# Patient Record
Sex: MALE | Race: WHITE | Employment: OTHER
[De-identification: names, ages, dates, MRNs, and addresses within clinical notes are randomized per-mention and may not be internally consistent; named-entity substitution may affect disease eponyms.]

---

## 2023-07-27 ENCOUNTER — APPOINTMENT (OUTPATIENT)
Facility: HOSPITAL | Age: 73
End: 2023-07-27
Payer: OTHER GOVERNMENT

## 2023-07-27 ENCOUNTER — HOSPITAL ENCOUNTER (EMERGENCY)
Facility: HOSPITAL | Age: 73
Discharge: ANOTHER HOSPITAL | End: 2023-07-28
Attending: EMERGENCY MEDICINE
Payer: OTHER GOVERNMENT

## 2023-07-27 DIAGNOSIS — H40.212 ACUTE ANGLE-CLOSURE GLAUCOMA OF LEFT EYE: Primary | ICD-10-CM

## 2023-07-27 DIAGNOSIS — I10 ESSENTIAL HYPERTENSION: ICD-10-CM

## 2023-07-27 LAB
ALBUMIN SERPL-MCNC: 4.5 G/DL (ref 3.5–5)
ALBUMIN/GLOB SERPL: 1 (ref 1.1–2.2)
ALP SERPL-CCNC: 86 U/L (ref 45–117)
ALT SERPL-CCNC: 28 U/L (ref 12–78)
ANION GAP SERPL CALC-SCNC: 13 MMOL/L (ref 5–15)
AST SERPL-CCNC: 21 U/L (ref 15–37)
BASOPHILS # BLD: 0 K/UL (ref 0–0.1)
BASOPHILS NFR BLD: 0 % (ref 0–1)
BILIRUB SERPL-MCNC: 1 MG/DL (ref 0.2–1)
BUN SERPL-MCNC: 24 MG/DL (ref 6–20)
BUN/CREAT SERPL: 19 (ref 12–20)
CALCIUM SERPL-MCNC: 10 MG/DL (ref 8.5–10.1)
CHLORIDE SERPL-SCNC: 102 MMOL/L (ref 97–108)
CO2 SERPL-SCNC: 20 MMOL/L (ref 21–32)
CREAT SERPL-MCNC: 1.24 MG/DL (ref 0.7–1.3)
DIFFERENTIAL METHOD BLD: ABNORMAL
EOSINOPHIL # BLD: 0 K/UL (ref 0–0.4)
EOSINOPHIL NFR BLD: 0 % (ref 0–7)
ERYTHROCYTE [DISTWIDTH] IN BLOOD BY AUTOMATED COUNT: 12.1 % (ref 11.5–14.5)
GLOBULIN SER CALC-MCNC: 4.4 G/DL (ref 2–4)
GLUCOSE SERPL-MCNC: 284 MG/DL (ref 65–100)
HCT VFR BLD AUTO: 46.1 % (ref 36.6–50.3)
HGB BLD-MCNC: 16 G/DL (ref 12.1–17)
IMM GRANULOCYTES # BLD AUTO: 0.1 K/UL (ref 0–0.04)
IMM GRANULOCYTES NFR BLD AUTO: 1 % (ref 0–0.5)
LIPASE SERPL-CCNC: 51 U/L (ref 73–393)
LYMPHOCYTES # BLD: 1 K/UL (ref 0.8–3.5)
LYMPHOCYTES NFR BLD: 7 % (ref 12–49)
MAGNESIUM SERPL-MCNC: 2 MG/DL (ref 1.6–2.4)
MCH RBC QN AUTO: 29.7 PG (ref 26–34)
MCHC RBC AUTO-ENTMCNC: 34.7 G/DL (ref 30–36.5)
MCV RBC AUTO: 85.7 FL (ref 80–99)
MONOCYTES # BLD: 0.4 K/UL (ref 0–1)
MONOCYTES NFR BLD: 3 % (ref 5–13)
NEUTS SEG # BLD: 12.4 K/UL (ref 1.8–8)
NEUTS SEG NFR BLD: 89 % (ref 32–75)
NRBC # BLD: 0 K/UL (ref 0–0.01)
NRBC BLD-RTO: 0 PER 100 WBC
PLATELET # BLD AUTO: 238 K/UL (ref 150–400)
PMV BLD AUTO: 9.6 FL (ref 8.9–12.9)
POTASSIUM SERPL-SCNC: 5.1 MMOL/L (ref 3.5–5.1)
PROT SERPL-MCNC: 8.9 G/DL (ref 6.4–8.2)
RBC # BLD AUTO: 5.38 M/UL (ref 4.1–5.7)
SODIUM SERPL-SCNC: 135 MMOL/L (ref 136–145)
TROPONIN I SERPL HS-MCNC: 14 NG/L (ref 0–76)
WBC # BLD AUTO: 13.8 K/UL (ref 4.1–11.1)

## 2023-07-27 PROCEDURE — 84484 ASSAY OF TROPONIN QUANT: CPT

## 2023-07-27 PROCEDURE — 80053 COMPREHEN METABOLIC PANEL: CPT

## 2023-07-27 PROCEDURE — 96365 THER/PROPH/DIAG IV INF INIT: CPT

## 2023-07-27 PROCEDURE — 96375 TX/PRO/DX INJ NEW DRUG ADDON: CPT

## 2023-07-27 PROCEDURE — 2500000003 HC RX 250 WO HCPCS: Performed by: EMERGENCY MEDICINE

## 2023-07-27 PROCEDURE — 36415 COLL VENOUS BLD VENIPUNCTURE: CPT

## 2023-07-27 PROCEDURE — 99285 EMERGENCY DEPT VISIT HI MDM: CPT

## 2023-07-27 PROCEDURE — 83735 ASSAY OF MAGNESIUM: CPT

## 2023-07-27 PROCEDURE — 74177 CT ABD & PELVIS W/CONTRAST: CPT

## 2023-07-27 PROCEDURE — 93005 ELECTROCARDIOGRAM TRACING: CPT | Performed by: EMERGENCY MEDICINE

## 2023-07-27 PROCEDURE — 2580000003 HC RX 258: Performed by: STUDENT IN AN ORGANIZED HEALTH CARE EDUCATION/TRAINING PROGRAM

## 2023-07-27 PROCEDURE — 85025 COMPLETE CBC W/AUTO DIFF WBC: CPT

## 2023-07-27 PROCEDURE — 6360000004 HC RX CONTRAST MEDICATION: Performed by: EMERGENCY MEDICINE

## 2023-07-27 PROCEDURE — 6360000002 HC RX W HCPCS: Performed by: STUDENT IN AN ORGANIZED HEALTH CARE EDUCATION/TRAINING PROGRAM

## 2023-07-27 PROCEDURE — 83690 ASSAY OF LIPASE: CPT

## 2023-07-27 PROCEDURE — 6370000000 HC RX 637 (ALT 250 FOR IP): Performed by: EMERGENCY MEDICINE

## 2023-07-27 RX ORDER — MANNITOL 20 G/100ML
100 INJECTION, SOLUTION INTRAVENOUS ONCE
Status: DISCONTINUED | OUTPATIENT
Start: 2023-07-27 | End: 2023-07-28 | Stop reason: HOSPADM

## 2023-07-27 RX ORDER — PILOCARPINE HYDROCHLORIDE 10 MG/ML
1 SOLUTION/ DROPS OPHTHALMIC
Status: DISCONTINUED | OUTPATIENT
Start: 2023-07-27 | End: 2023-07-27

## 2023-07-27 RX ORDER — ACETAZOLAMIDE 250 MG/1
500 TABLET ORAL
Status: COMPLETED | OUTPATIENT
Start: 2023-07-27 | End: 2023-07-27

## 2023-07-27 RX ORDER — ONDANSETRON 2 MG/ML
4 INJECTION INTRAMUSCULAR; INTRAVENOUS EVERY 6 HOURS PRN
Status: DISCONTINUED | OUTPATIENT
Start: 2023-07-27 | End: 2023-07-28 | Stop reason: HOSPADM

## 2023-07-27 RX ORDER — TIMOLOL MALEATE 5 MG/ML
1 SOLUTION/ DROPS OPHTHALMIC
Status: COMPLETED | OUTPATIENT
Start: 2023-07-27 | End: 2023-07-27

## 2023-07-27 RX ORDER — 0.9 % SODIUM CHLORIDE 0.9 %
1000 INTRAVENOUS SOLUTION INTRAVENOUS ONCE
Status: COMPLETED | OUTPATIENT
Start: 2023-07-27 | End: 2023-07-27

## 2023-07-27 RX ORDER — MANNITOL 20 G/100ML
0.25 INJECTION, SOLUTION INTRAVENOUS ONCE
Status: DISCONTINUED | OUTPATIENT
Start: 2023-07-27 | End: 2023-07-27

## 2023-07-27 RX ORDER — MANNITOL 20 G/100ML
100 INJECTION, SOLUTION INTRAVENOUS ONCE
Status: COMPLETED | OUTPATIENT
Start: 2023-07-27 | End: 2023-07-27

## 2023-07-27 RX ADMIN — TIMOLOL MALEATE 1 DROP: 5 SOLUTION/ DROPS OPHTHALMIC at 22:36

## 2023-07-27 RX ADMIN — SODIUM CHLORIDE 1000 ML: 9 INJECTION, SOLUTION INTRAVENOUS at 20:17

## 2023-07-27 RX ADMIN — ONDANSETRON 4 MG: 2 INJECTION INTRAMUSCULAR; INTRAVENOUS at 20:16

## 2023-07-27 RX ADMIN — IOPAMIDOL 100 ML: 755 INJECTION, SOLUTION INTRAVENOUS at 22:29

## 2023-07-27 RX ADMIN — MANNITOL 100 G: 20 INJECTION, SOLUTION INTRAVENOUS at 22:40

## 2023-07-27 RX ADMIN — ACETAZOLAMIDE 500 MG: 250 TABLET ORAL at 22:38

## 2023-07-27 ASSESSMENT — PAIN DESCRIPTION - LOCATION: LOCATION: ABDOMEN

## 2023-07-27 ASSESSMENT — LIFESTYLE VARIABLES
HOW MANY STANDARD DRINKS CONTAINING ALCOHOL DO YOU HAVE ON A TYPICAL DAY: PATIENT DOES NOT DRINK
HOW OFTEN DO YOU HAVE A DRINK CONTAINING ALCOHOL: NEVER

## 2023-07-27 ASSESSMENT — PAIN - FUNCTIONAL ASSESSMENT: PAIN_FUNCTIONAL_ASSESSMENT: 0-10

## 2023-07-28 VITALS
TEMPERATURE: 98.2 F | HEIGHT: 74 IN | HEART RATE: 83 BPM | OXYGEN SATURATION: 100 % | DIASTOLIC BLOOD PRESSURE: 92 MMHG | SYSTOLIC BLOOD PRESSURE: 196 MMHG | WEIGHT: 210 LBS | BODY MASS INDEX: 26.95 KG/M2 | RESPIRATION RATE: 20 BRPM

## 2023-07-28 LAB
EKG ATRIAL RATE: 85 BPM
EKG DIAGNOSIS: NORMAL
EKG P AXIS: 70 DEGREES
EKG P-R INTERVAL: 144 MS
EKG Q-T INTERVAL: 428 MS
EKG QRS DURATION: 104 MS
EKG QTC CALCULATION (BAZETT): 509 MS
EKG R AXIS: 2 DEGREES
EKG T AXIS: 56 DEGREES
EKG VENTRICULAR RATE: 85 BPM

## 2023-07-28 NOTE — ED NOTES
Hold on Mannitol briefly, ordering being changed at this time. Pt currently in CT.       Kinsleynorma Faustin RN  07/27/23 0334

## 2023-07-28 NOTE — ED NOTES
Attempted to call pt's daughter no answer, left message. Pt leaving with hospital to home currently.       Jennifer Alonzo RN  07/28/23 7610

## 2023-07-28 NOTE — ED NOTES
Per Dr Clary Bianchi ok to give Diamox at this time, will give all ordered medications when pt comes back from CT.       Jennifer Alonzo RN  07/27/23 6658

## 2023-07-28 NOTE — ED NOTES
Pt resting in room, ordered medications given. Pt reports nausea is better since medication earlier. Pt [facility] denies any other needs.       Opal Pruett RN  07/27/23 9468

## 2023-07-28 NOTE — ED NOTES
Went to give ordered Diamox-per Dr Lauryn Claros to hold at this time until clarified with provider at the [place].       Melony Kenyon RN  07/27/23 2920

## 2023-07-28 NOTE — ED NOTES
Report given to Govind Johnson RN at the [facility] to home here for transport-waiting on Mannitol infusion to be complete per Dr Redd Espino.       Rafaela Mora RN  07/27/23 9953

## 2023-07-28 NOTE — ED NOTES
Assumed care of pt. Pt had recent left eye sx on Tues, did not go as planned and needing to have repeat-unable to see out of left eye, then started having generalized body aches, abdominal pain with vomiting since yesterday-has been in home without ac. Pt AOX4, PWD.      Kam Aggarwal, RN  07/27/23 2007

## 2025-07-22 ENCOUNTER — HOSPITAL ENCOUNTER (INPATIENT)
Facility: HOSPITAL | Age: 75
LOS: 8 days | Discharge: NURSING, REHAB OR LONG-TERM CARE | DRG: 871 | End: 2025-07-30
Attending: STUDENT IN AN ORGANIZED HEALTH CARE EDUCATION/TRAINING PROGRAM | Admitting: HOSPITALIST
Payer: OTHER GOVERNMENT

## 2025-07-22 ENCOUNTER — APPOINTMENT (OUTPATIENT)
Facility: HOSPITAL | Age: 75
DRG: 871 | End: 2025-07-22
Payer: OTHER GOVERNMENT

## 2025-07-22 DIAGNOSIS — R42 VERTIGO: ICD-10-CM

## 2025-07-22 DIAGNOSIS — A41.9 SEPSIS, DUE TO UNSPECIFIED ORGANISM, UNSPECIFIED WHETHER ACUTE ORGAN DYSFUNCTION PRESENT (HCC): ICD-10-CM

## 2025-07-22 DIAGNOSIS — M54.2 NECK PAIN: ICD-10-CM

## 2025-07-22 DIAGNOSIS — R41.82 ALTERED MENTAL STATUS, UNSPECIFIED ALTERED MENTAL STATUS TYPE: Primary | ICD-10-CM

## 2025-07-22 DIAGNOSIS — E13.10 DIABETIC KETOACIDOSIS WITHOUT COMA ASSOCIATED WITH OTHER SPECIFIED DIABETES MELLITUS (HCC): ICD-10-CM

## 2025-07-22 PROBLEM — E10.10 DKA, TYPE 1, NOT AT GOAL (HCC): Status: ACTIVE | Noted: 2025-07-22

## 2025-07-22 LAB
ALBUMIN SERPL-MCNC: 4 G/DL (ref 3.5–5)
ALBUMIN/GLOB SERPL: 1 (ref 1.1–2.2)
ALP SERPL-CCNC: 103 U/L (ref 45–117)
ALT SERPL-CCNC: 29 U/L (ref 12–78)
ANION GAP BLD CALC-SCNC: 7 (ref 10–20)
ANION GAP SERPL CALC-SCNC: 11 MMOL/L (ref 2–12)
ANION GAP SERPL CALC-SCNC: 19 MMOL/L (ref 2–12)
APPEARANCE UR: CLEAR
AST SERPL-CCNC: 29 U/L (ref 15–37)
BACTERIA URNS QL MICRO: NEGATIVE /HPF
BASE DEFICIT BLD-SCNC: 10.7 MMOL/L
BASOPHILS # BLD: 0.04 K/UL (ref 0–0.1)
BASOPHILS NFR BLD: 0.2 % (ref 0–1)
BILIRUB SERPL-MCNC: 1.1 MG/DL (ref 0.2–1)
BILIRUB UR QL: NEGATIVE
BUN SERPL-MCNC: 40 MG/DL (ref 6–20)
BUN SERPL-MCNC: 42 MG/DL (ref 6–20)
BUN/CREAT SERPL: 21 (ref 12–20)
BUN/CREAT SERPL: 23 (ref 12–20)
CA-I BLD-MCNC: 1.13 MMOL/L (ref 1.15–1.33)
CALCIUM SERPL-MCNC: 10 MG/DL (ref 8.5–10.1)
CALCIUM SERPL-MCNC: 9.4 MG/DL (ref 8.5–10.1)
CHLORIDE BLD-SCNC: 111 MMOL/L (ref 100–111)
CHLORIDE SERPL-SCNC: 100 MMOL/L (ref 97–108)
CHLORIDE SERPL-SCNC: 113 MMOL/L (ref 97–108)
CK SERPL-CCNC: 910 U/L (ref 39–308)
CO2 BLD-SCNC: 12 MMOL/L (ref 22–29)
CO2 SERPL-SCNC: 15 MMOL/L (ref 21–32)
CO2 SERPL-SCNC: 19 MMOL/L (ref 21–32)
COLOR UR: ABNORMAL
COMMENT:: NORMAL
CREAT SERPL-MCNC: 1.84 MG/DL (ref 0.7–1.3)
CREAT SERPL-MCNC: 1.94 MG/DL (ref 0.7–1.3)
CREAT UR-MCNC: 1.5 MG/DL (ref 0.6–1.3)
DIFFERENTIAL METHOD BLD: ABNORMAL
EOSINOPHIL # BLD: 0.14 K/UL (ref 0–0.4)
EOSINOPHIL NFR BLD: 0.8 % (ref 0–7)
EPITH CASTS URNS QL MICRO: ABNORMAL /LPF
ERYTHROCYTE [DISTWIDTH] IN BLOOD BY AUTOMATED COUNT: 13.6 % (ref 11.5–14.5)
ETHANOL SERPL-MCNC: <10 MG/DL (ref 0–0.08)
GLOBULIN SER CALC-MCNC: 4 G/DL (ref 2–4)
GLUCOSE BLD STRIP.AUTO-MCNC: 197 MG/DL (ref 65–117)
GLUCOSE BLD STRIP.AUTO-MCNC: 263 MG/DL (ref 65–117)
GLUCOSE BLD STRIP.AUTO-MCNC: 360 MG/DL (ref 65–117)
GLUCOSE BLD STRIP.AUTO-MCNC: 376 MG/DL (ref 65–117)
GLUCOSE BLD STRIP.AUTO-MCNC: 425 MG/DL (ref 65–117)
GLUCOSE BLD STRIP.AUTO-MCNC: 526 MG/DL (ref 65–117)
GLUCOSE BLD STRIP.AUTO-MCNC: >600 MG/DL (ref 65–117)
GLUCOSE BLD STRIP.AUTO-MCNC: >700 MG/DL (ref 74–99)
GLUCOSE SERPL-MCNC: 468 MG/DL (ref 65–100)
GLUCOSE SERPL-MCNC: 733 MG/DL (ref 65–100)
GLUCOSE UR STRIP.AUTO-MCNC: >1000 MG/DL
HCO3 BLDA-SCNC: 12 MMOL/L
HCT VFR BLD AUTO: 39.3 % (ref 36.6–50.3)
HGB BLD-MCNC: 13.5 G/DL (ref 12.1–17)
HGB UR QL STRIP: ABNORMAL
IMM GRANULOCYTES # BLD AUTO: 0.16 K/UL (ref 0–0.04)
IMM GRANULOCYTES NFR BLD AUTO: 0.9 % (ref 0–0.5)
KETONES UR QL STRIP.AUTO: 80 MG/DL
LACTATE BLD-SCNC: 1.42 MMOL/L (ref 0.4–2)
LACTATE BLD-SCNC: 3.88 MMOL/L (ref 0.4–2)
LACTATE SERPL-SCNC: 2.3 MMOL/L (ref 0.4–2)
LEUKOCYTE ESTERASE UR QL STRIP.AUTO: NEGATIVE
LYMPHOCYTES # BLD: 0.79 K/UL (ref 0.8–3.5)
LYMPHOCYTES NFR BLD: 4.4 % (ref 12–49)
MAGNESIUM SERPL-MCNC: 2.5 MG/DL (ref 1.6–2.4)
MAGNESIUM SERPL-MCNC: 2.5 MG/DL (ref 1.6–2.4)
MCH RBC QN AUTO: 29.2 PG (ref 26–34)
MCHC RBC AUTO-ENTMCNC: 34.4 G/DL (ref 30–36.5)
MCV RBC AUTO: 85.1 FL (ref 80–99)
MONOCYTES # BLD: 1.24 K/UL (ref 0–1)
MONOCYTES NFR BLD: 6.9 % (ref 5–13)
NEUTS SEG # BLD: 15.54 K/UL (ref 1.8–8)
NEUTS SEG NFR BLD: 86.8 % (ref 32–75)
NITRITE UR QL STRIP.AUTO: NEGATIVE
NRBC # BLD: 0 K/UL (ref 0–0.01)
NRBC BLD-RTO: 0 PER 100 WBC
PCO2 BLDV: 21.1 MMHG (ref 41–51)
PH BLDV: 7.37 (ref 7.32–7.42)
PH UR STRIP: 5 (ref 5–8)
PHOSPHATE SERPL-MCNC: 4.9 MG/DL (ref 2.6–4.7)
PLATELET # BLD AUTO: 296 K/UL (ref 150–400)
PMV BLD AUTO: 10.2 FL (ref 8.9–12.9)
PO2 BLDV: 57 MMHG (ref 25–40)
POTASSIUM BLD-SCNC: 4.2 MMOL/L (ref 3.5–5.5)
POTASSIUM SERPL-SCNC: 3.9 MMOL/L (ref 3.5–5.1)
POTASSIUM SERPL-SCNC: 4.3 MMOL/L (ref 3.5–5.1)
PROT SERPL-MCNC: 8 G/DL (ref 6.4–8.2)
PROT UR STRIP-MCNC: 100 MG/DL
RBC # BLD AUTO: 4.62 M/UL (ref 4.1–5.7)
RBC #/AREA URNS HPF: ABNORMAL /HPF (ref 0–5)
RBC MORPH BLD: ABNORMAL
SAO2 % BLD: 90 % (ref 94–98)
SERVICE CMNT-IMP: ABNORMAL
SODIUM BLD-SCNC: 130 MMOL/L (ref 136–145)
SODIUM SERPL-SCNC: 134 MMOL/L (ref 136–145)
SODIUM SERPL-SCNC: 143 MMOL/L (ref 136–145)
SP GR UR REFRACTOMETRY: 1.02 (ref 1–1.03)
SPECIMEN HOLD: NORMAL
SPECIMEN SITE: ABNORMAL
TROPONIN I SERPL HS-MCNC: 145 NG/L (ref 0–76)
URINE CULTURE IF INDICATED: ABNORMAL
UROBILINOGEN UR QL STRIP.AUTO: 0.2 EU/DL (ref 0.2–1)
WBC # BLD AUTO: 17.9 K/UL (ref 4.1–11.1)
WBC URNS QL MICRO: ABNORMAL /HPF (ref 0–4)

## 2025-07-22 PROCEDURE — 83605 ASSAY OF LACTIC ACID: CPT

## 2025-07-22 PROCEDURE — 80053 COMPREHEN METABOLIC PANEL: CPT

## 2025-07-22 PROCEDURE — 84100 ASSAY OF PHOSPHORUS: CPT

## 2025-07-22 PROCEDURE — 2580000003 HC RX 258: Performed by: STUDENT IN AN ORGANIZED HEALTH CARE EDUCATION/TRAINING PROGRAM

## 2025-07-22 PROCEDURE — 87186 SC STD MICRODIL/AGAR DIL: CPT

## 2025-07-22 PROCEDURE — 87154 CUL TYP ID BLD PTHGN 6+ TRGT: CPT

## 2025-07-22 PROCEDURE — 84484 ASSAY OF TROPONIN QUANT: CPT

## 2025-07-22 PROCEDURE — 82803 BLOOD GASES ANY COMBINATION: CPT

## 2025-07-22 PROCEDURE — 84295 ASSAY OF SERUM SODIUM: CPT

## 2025-07-22 PROCEDURE — 87040 BLOOD CULTURE FOR BACTERIA: CPT

## 2025-07-22 PROCEDURE — 2500000003 HC RX 250 WO HCPCS: Performed by: STUDENT IN AN ORGANIZED HEALTH CARE EDUCATION/TRAINING PROGRAM

## 2025-07-22 PROCEDURE — 6360000002 HC RX W HCPCS: Performed by: STUDENT IN AN ORGANIZED HEALTH CARE EDUCATION/TRAINING PROGRAM

## 2025-07-22 PROCEDURE — 6370000000 HC RX 637 (ALT 250 FOR IP): Performed by: STUDENT IN AN ORGANIZED HEALTH CARE EDUCATION/TRAINING PROGRAM

## 2025-07-22 PROCEDURE — 2580000003 HC RX 258: Performed by: NURSE PRACTITIONER

## 2025-07-22 PROCEDURE — 96365 THER/PROPH/DIAG IV INF INIT: CPT

## 2025-07-22 PROCEDURE — 96375 TX/PRO/DX INJ NEW DRUG ADDON: CPT

## 2025-07-22 PROCEDURE — 80048 BASIC METABOLIC PNL TOTAL CA: CPT

## 2025-07-22 PROCEDURE — 70450 CT HEAD/BRAIN W/O DYE: CPT

## 2025-07-22 PROCEDURE — 36415 COLL VENOUS BLD VENIPUNCTURE: CPT

## 2025-07-22 PROCEDURE — 87077 CULTURE AEROBIC IDENTIFY: CPT

## 2025-07-22 PROCEDURE — 81001 URINALYSIS AUTO W/SCOPE: CPT

## 2025-07-22 PROCEDURE — 82550 ASSAY OF CK (CPK): CPT

## 2025-07-22 PROCEDURE — 99285 EMERGENCY DEPT VISIT HI MDM: CPT

## 2025-07-22 PROCEDURE — 96372 THER/PROPH/DIAG INJ SC/IM: CPT

## 2025-07-22 PROCEDURE — 71045 X-RAY EXAM CHEST 1 VIEW: CPT

## 2025-07-22 PROCEDURE — 82947 ASSAY GLUCOSE BLOOD QUANT: CPT

## 2025-07-22 PROCEDURE — 82077 ASSAY SPEC XCP UR&BREATH IA: CPT

## 2025-07-22 PROCEDURE — 2580000003 HC RX 258: Performed by: HOSPITALIST

## 2025-07-22 PROCEDURE — 84132 ASSAY OF SERUM POTASSIUM: CPT

## 2025-07-22 PROCEDURE — 2000000000 HC ICU R&B

## 2025-07-22 PROCEDURE — 2500000003 HC RX 250 WO HCPCS: Performed by: HOSPITALIST

## 2025-07-22 PROCEDURE — 82330 ASSAY OF CALCIUM: CPT

## 2025-07-22 PROCEDURE — 82962 GLUCOSE BLOOD TEST: CPT

## 2025-07-22 PROCEDURE — 85025 COMPLETE CBC W/AUTO DIFF WBC: CPT

## 2025-07-22 PROCEDURE — 6360000002 HC RX W HCPCS: Performed by: HOSPITALIST

## 2025-07-22 PROCEDURE — 83735 ASSAY OF MAGNESIUM: CPT

## 2025-07-22 RX ORDER — ACETAMINOPHEN 325 MG/1
650 TABLET ORAL EVERY 6 HOURS PRN
Status: DISCONTINUED | OUTPATIENT
Start: 2025-07-22 | End: 2025-07-30 | Stop reason: HOSPADM

## 2025-07-22 RX ORDER — SODIUM CHLORIDE, SODIUM LACTATE, POTASSIUM CHLORIDE, AND CALCIUM CHLORIDE .6; .31; .03; .02 G/100ML; G/100ML; G/100ML; G/100ML
1000 INJECTION, SOLUTION INTRAVENOUS ONCE
Status: COMPLETED | OUTPATIENT
Start: 2025-07-22 | End: 2025-07-22

## 2025-07-22 RX ORDER — ATORVASTATIN CALCIUM 80 MG/1
80 TABLET, FILM COATED ORAL EVERY EVENING
COMMUNITY

## 2025-07-22 RX ORDER — SODIUM CHLORIDE 9 MG/ML
INJECTION, SOLUTION INTRAVENOUS PRN
Status: DISCONTINUED | OUTPATIENT
Start: 2025-07-22 | End: 2025-07-29 | Stop reason: SDUPTHER

## 2025-07-22 RX ORDER — SODIUM CHLORIDE 0.9 % (FLUSH) 0.9 %
5-40 SYRINGE (ML) INJECTION PRN
Status: DISCONTINUED | OUTPATIENT
Start: 2025-07-22 | End: 2025-07-30 | Stop reason: HOSPADM

## 2025-07-22 RX ORDER — SODIUM CHLORIDE 0.9 % (FLUSH) 0.9 %
5-40 SYRINGE (ML) INJECTION EVERY 12 HOURS SCHEDULED
Status: DISCONTINUED | OUTPATIENT
Start: 2025-07-22 | End: 2025-07-30 | Stop reason: HOSPADM

## 2025-07-22 RX ORDER — PREDNISOLONE ACETATE 10 MG/ML
1 SUSPENSION/ DROPS OPHTHALMIC DAILY
COMMUNITY

## 2025-07-22 RX ORDER — MAGNESIUM SULFATE IN WATER 40 MG/ML
2000 INJECTION, SOLUTION INTRAVENOUS PRN
Status: DISCONTINUED | OUTPATIENT
Start: 2025-07-22 | End: 2025-07-22 | Stop reason: SDUPTHER

## 2025-07-22 RX ORDER — SODIUM CHLORIDE 450 MG/100ML
INJECTION, SOLUTION INTRAVENOUS CONTINUOUS
Status: DISCONTINUED | OUTPATIENT
Start: 2025-07-22 | End: 2025-07-23

## 2025-07-22 RX ORDER — ACETAMINOPHEN 160 MG
2000 TABLET,DISINTEGRATING ORAL DAILY
COMMUNITY

## 2025-07-22 RX ORDER — TIMOLOL HEMIHYDRATE 5 MG/ML
1 SOLUTION/ DROPS OPHTHALMIC DAILY
COMMUNITY

## 2025-07-22 RX ORDER — HALOPERIDOL 5 MG/ML
5 INJECTION INTRAMUSCULAR ONCE
Status: COMPLETED | OUTPATIENT
Start: 2025-07-22 | End: 2025-07-22

## 2025-07-22 RX ORDER — AMLODIPINE BESYLATE 10 MG/1
10 TABLET ORAL DAILY
COMMUNITY

## 2025-07-22 RX ORDER — ONDANSETRON 4 MG/1
4 TABLET, ORALLY DISINTEGRATING ORAL EVERY 8 HOURS PRN
Status: DISCONTINUED | OUTPATIENT
Start: 2025-07-22 | End: 2025-07-30 | Stop reason: HOSPADM

## 2025-07-22 RX ORDER — MIDAZOLAM HYDROCHLORIDE 5 MG/5ML
2 INJECTION, SOLUTION INTRAMUSCULAR; INTRAVENOUS ONCE
Status: COMPLETED | OUTPATIENT
Start: 2025-07-22 | End: 2025-07-22

## 2025-07-22 RX ORDER — POTASSIUM CHLORIDE 29.8 MG/ML
20 INJECTION INTRAVENOUS PRN
Status: DISCONTINUED | OUTPATIENT
Start: 2025-07-22 | End: 2025-07-22

## 2025-07-22 RX ORDER — 0.9 % SODIUM CHLORIDE 0.9 %
1000 INTRAVENOUS SOLUTION INTRAVENOUS ONCE
Status: COMPLETED | OUTPATIENT
Start: 2025-07-22 | End: 2025-07-22

## 2025-07-22 RX ORDER — MULTIVITAMIN WITH IRON
1000 TABLET ORAL DAILY
COMMUNITY

## 2025-07-22 RX ORDER — OFLOXACIN 3 MG/ML
1 SOLUTION/ DROPS OPHTHALMIC 4 TIMES DAILY
COMMUNITY

## 2025-07-22 RX ORDER — ACETAMINOPHEN 650 MG/1
650 SUPPOSITORY RECTAL EVERY 6 HOURS PRN
Status: DISCONTINUED | OUTPATIENT
Start: 2025-07-22 | End: 2025-07-30 | Stop reason: HOSPADM

## 2025-07-22 RX ORDER — POLYETHYLENE GLYCOL 3350 17 G/17G
17 POWDER, FOR SOLUTION ORAL DAILY PRN
Status: DISCONTINUED | OUTPATIENT
Start: 2025-07-22 | End: 2025-07-30 | Stop reason: HOSPADM

## 2025-07-22 RX ORDER — METHOCARBAMOL 750 MG/1
750 TABLET, FILM COATED ORAL EVERY 6 HOURS PRN
COMMUNITY

## 2025-07-22 RX ORDER — POTASSIUM CHLORIDE 7.45 MG/ML
10 INJECTION INTRAVENOUS PRN
Status: DISCONTINUED | OUTPATIENT
Start: 2025-07-22 | End: 2025-07-22

## 2025-07-22 RX ORDER — DULOXETIN HYDROCHLORIDE 60 MG/1
60 CAPSULE, DELAYED RELEASE ORAL NIGHTLY
COMMUNITY

## 2025-07-22 RX ORDER — MAGNESIUM SULFATE IN WATER 40 MG/ML
2000 INJECTION, SOLUTION INTRAVENOUS PRN
Status: DISCONTINUED | OUTPATIENT
Start: 2025-07-22 | End: 2025-07-22

## 2025-07-22 RX ORDER — DEXTROSE MONOHYDRATE AND SODIUM CHLORIDE 5; .45 G/100ML; G/100ML
INJECTION, SOLUTION INTRAVENOUS CONTINUOUS PRN
Status: DISCONTINUED | OUTPATIENT
Start: 2025-07-22 | End: 2025-07-30 | Stop reason: HOSPADM

## 2025-07-22 RX ORDER — MIDAZOLAM HYDROCHLORIDE 5 MG/5ML
2 INJECTION, SOLUTION INTRAMUSCULAR; INTRAVENOUS ONCE
Status: DISCONTINUED | OUTPATIENT
Start: 2025-07-22 | End: 2025-07-22

## 2025-07-22 RX ORDER — ONDANSETRON 2 MG/ML
4 INJECTION INTRAMUSCULAR; INTRAVENOUS EVERY 6 HOURS PRN
Status: DISCONTINUED | OUTPATIENT
Start: 2025-07-22 | End: 2025-07-30 | Stop reason: HOSPADM

## 2025-07-22 RX ORDER — LISINOPRIL 40 MG/1
40 TABLET ORAL DAILY
Status: ON HOLD | COMMUNITY
End: 2025-07-30 | Stop reason: HOSPADM

## 2025-07-22 RX ORDER — HALOPERIDOL 5 MG/ML
5 INJECTION INTRAMUSCULAR EVERY 6 HOURS PRN
Status: DISCONTINUED | OUTPATIENT
Start: 2025-07-22 | End: 2025-07-22

## 2025-07-22 RX ORDER — ENOXAPARIN SODIUM 100 MG/ML
30 INJECTION SUBCUTANEOUS 2 TIMES DAILY
Status: DISCONTINUED | OUTPATIENT
Start: 2025-07-22 | End: 2025-07-30 | Stop reason: HOSPADM

## 2025-07-22 RX ORDER — CARVEDILOL 25 MG/1
25 TABLET ORAL 2 TIMES DAILY WITH MEALS
COMMUNITY

## 2025-07-22 RX ORDER — DEXMEDETOMIDINE HYDROCHLORIDE 4 UG/ML
.1-1.5 INJECTION, SOLUTION INTRAVENOUS CONTINUOUS
Status: DISCONTINUED | OUTPATIENT
Start: 2025-07-22 | End: 2025-07-23

## 2025-07-22 RX ADMIN — SODIUM CHLORIDE 15 UNITS/HR: 9 INJECTION, SOLUTION INTRAVENOUS at 22:04

## 2025-07-22 RX ADMIN — SODIUM CHLORIDE 12.6 UNITS/HR: 9 INJECTION, SOLUTION INTRAVENOUS at 21:13

## 2025-07-22 RX ADMIN — SODIUM CHLORIDE: 0.45 INJECTION, SOLUTION INTRAVENOUS at 18:57

## 2025-07-22 RX ADMIN — MIDAZOLAM 2 MG: 1 INJECTION INTRAMUSCULAR; INTRAVENOUS at 14:05

## 2025-07-22 RX ADMIN — SODIUM CHLORIDE 10.8 UNITS/HR: 9 INJECTION, SOLUTION INTRAVENOUS at 16:34

## 2025-07-22 RX ADMIN — DEXMEDETOMIDINE HYDROCHLORIDE 0.4 MCG/KG/HR: 400 INJECTION, SOLUTION INTRAVENOUS at 21:48

## 2025-07-22 RX ADMIN — PIPERACILLIN AND TAZOBACTAM 3375 MG: 3; .375 INJECTION, POWDER, LYOPHILIZED, FOR SOLUTION INTRAVENOUS at 15:04

## 2025-07-22 RX ADMIN — SODIUM CHLORIDE, PRESERVATIVE FREE 10 ML: 5 INJECTION INTRAVENOUS at 21:54

## 2025-07-22 RX ADMIN — DEXMEDETOMIDINE HYDROCHLORIDE 0.2 MCG/KG/HR: 400 INJECTION, SOLUTION INTRAVENOUS at 15:42

## 2025-07-22 RX ADMIN — PIPERACILLIN AND TAZOBACTAM 3375 MG: 3; .375 INJECTION, POWDER, FOR SOLUTION INTRAVENOUS at 21:54

## 2025-07-22 RX ADMIN — SODIUM CHLORIDE, SODIUM LACTATE, POTASSIUM CHLORIDE, AND CALCIUM CHLORIDE 1000 ML: .6; .31; .03; .02 INJECTION, SOLUTION INTRAVENOUS at 19:53

## 2025-07-22 RX ADMIN — SODIUM CHLORIDE 1000 ML: 0.9 INJECTION, SOLUTION INTRAVENOUS at 14:52

## 2025-07-22 RX ADMIN — HALOPERIDOL LACTATE 5 MG: 5 INJECTION, SOLUTION INTRAMUSCULAR at 13:40

## 2025-07-22 RX ADMIN — ENOXAPARIN SODIUM 30 MG: 100 INJECTION SUBCUTANEOUS at 21:53

## 2025-07-22 ASSESSMENT — PAIN SCALES - GENERAL: PAINLEVEL_OUTOF10: 4

## 2025-07-22 NOTE — ED NOTES
Patient uncooperative and not keeping on blankets, gown, cardiac monitor or BP cuff. Veronica Alvarez, DO aware.

## 2025-07-22 NOTE — ED NOTES
TRANSFER - OUT REPORT:    Verbal report given to Angelica, RN and Jessica RN on Alexandro Ortiz  being transferred to [facility] ICU 2510 for routine progression of patient care       Report consisted of patient's Situation, Background, Assessment and   Recommendations(SBAR).     Information from the following report(s) ED SBAR, Adult Overview, MAR, Recent Results, Med Rec Status, Cardiac Rhythm sinus tachy, and Neuro Assessment was reviewed with the receiving nurse.    [place] Assessment:                           Lines:   Peripheral IV 07/22/25 Right Antecubital (Active)   Site Assessment Clean, dry & intact 07/22/25 1339   Line Status Flushed;Blood return noted 07/22/25 1339   Line Care Line pulled back 07/22/25 1339   Phlebitis Assessment No symptoms 07/22/25 1339   Infiltration Assessment 0 07/22/25 1339   Alcohol Cap Used No 07/22/25 1339   Dressing Status Clean, dry & intact 07/22/25 1339   Dressing Type Securing device 07/22/25 1339       Peripheral IV 07/22/25 Left;Anterior Forearm (Active)   Site Assessment Clean, dry & intact 07/22/25 1515   Line Status Blood return noted;Flushed 07/22/25 1515   Line Care Connections checked and tightened 07/22/25 1515   Phlebitis Assessment No symptoms 07/22/25 1515   Infiltration Assessment 0 07/22/25 1515   Dressing Status Clean, dry & intact 07/22/25 1515   Dressing Type Transparent 07/22/25 1515       Peripheral IV 07/22/25 Left;Anterior Basilic (Active)   Site Assessment Clean, dry & intact 07/22/25 1525   Line Status Blood return noted;Flushed 07/22/25 1525   Line Care Connections checked and tightened 07/22/25 1525   Phlebitis Assessment No symptoms 07/22/25 1525   Infiltration Assessment 0 07/22/25 1525   Dressing Status Clean, dry & intact 07/22/25 1525   Dressing Type Transparent 07/22/25 1525        Opportunity for questions and clarification was provided.      Patient transported with:  Monitor, Patient-specific medications from Pharmacy, and Registered

## 2025-07-22 NOTE — ED PROVIDER NOTES
[facility] EMERGENCY DEPARTMENT  EMERGENCY DEPARTMENT ENCOUNTER       Pt Name: Alexandro Ortiz  MRN: [id number]  Birthdate 1950  Date of evaluation: 7/22/2025  Provider: Veronica Alvarez DO   PCP: Alexandro Vail MD  Note Started: 4:57 PM 7/22/25     CHIEF COMPLAINT       Chief Complaint   Patient presents with    Altered Mental Status        HISTORY OF PRESENT ILLNESS: 1 or more elements      History From: Patient, EMS  History is limited by altered mental status.     Alexandro Ortiz is a 75 y.o. male who presents with cc of altered mental status.  Patient was last heard from on Saturday by her niece.  They did a welfare check on the patient today and found him on the floor by his wheelchair, covered in feces.  Patient only able to say yes, no and help me.     Nursing Notes were all reviewed and agreed with or any disagreements were addressed in the HPI.       PAST HISTORY     Past Medical History:  No past medical history on file.      Past Surgical History:  No past surgical history on file.    Family History:  No family history on file.    Social History:       Allergies:  No Known Allergies    CURRENT MEDICATIONS      Previous Medications    AMLODIPINE (NORVASC) 10 MG TABLET    Take 1 tablet by mouth daily    ATORVASTATIN (LIPITOR) 80 MG TABLET    Take 1 tablet by mouth every evening    CARVEDILOL (COREG) 25 MG TABLET    Take 1 tablet by mouth 2 times daily (with meals)    DULOXETINE (CYMBALTA) 60 MG EXTENDED RELEASE CAPSULE    Take 1 capsule by mouth at bedtime    LISINOPRIL (PRINIVIL;ZESTRIL) 40 MG TABLET    Take 1 tablet by mouth daily    METFORMIN (GLUCOPHAGE) 500 MG TABLET    Take 2 tablets by mouth 2 times daily (with meals)    METHOCARBAMOL (ROBAXIN) 750 MG TABLET    Take 1 tablet by mouth every 6 hours as needed (muscle pain)    OFLOXACIN (OCUFLOX) 0.3 % SOLUTION    Place 1 drop into the left eye 4 times daily    PREDNISOLONE ACETATE (PRED FORTE) 1 % OPHTHALMIC SUSPENSION

## 2025-07-22 NOTE — ED TRIAGE NOTES
BIBEMS. Family had a welfare check performed on patient as he lives at home by self. EMS found patient on floor with his wheelchair by his head mumbling and speaking random words, saying \"help me.\" EMS unable to do stroke scale as patient is combative and non-compliant. Unsure how long patient has been on floor, lives by self. Last spoke to brother on Friday and Niece on Saturday.    Patient whole body covered in dry feces on arrival and kicking/swatting at nurses. Skin tears to both arms, right below knee amputation. EMS stated right pupil is reactive and left was non-reactive. BS read high on scene and repeat in route was 567.       History DM, triple bypass, right lower leg amputation.

## 2025-07-22 NOTE — H&P
[facility] HPI.      Name: Alexandro Ortiz   : 1950   MRN: [id number]   Date: 2025      Chief Complaint   Patient presents with    Altered Mental Status       Reason for ICU:     Acute encephalopathy, agitated state  DKA    HPI & Hospital course     No prior records available in the chart, patient receives his care at [facility].  EMS found patient at home lethargic, covered with feces so brought into emergency department.  Patient was agitated/combative on arrival, blood glucose more than 700 and bicarb of 15.  CT head unremarkable.  Patient given IV Haldol, followed by IV Versed x 2 without control of agitation and started on Precedex drip.  Patient has received 1 L of normal saline and starting DKA protocol. Patient has empirically been given IV Zosyn, blood culture sent.  Chest x-ray and UA are negative for infectious etiology.  ICU service was consulted for evaluation for ICU.  At time of my evaluation, patient appears ill and lethargic.  He does not answer questions.  He is tachycardic in 120s, regular rhythm, hypertensive in 170s to 180s systolic.  He appears cachectic, chronically ill and not respiratory distress.  Satting more than 95% on 2 L nasal cannula.     Assessment & Plan       # DKA, unclear precipitating factor.  Negative UA and chest x-ray for infectious etiology.  - History of diabetes, receives care at [facility].  Unclear if patient has been compliant to home insulin regimen.  Plan  - Insulin drip per DKA protocol.  - Hold insulin gtt if K is less than 3.3  - Aggressive electrolytes replacement.  - Monitor glucose Q1hr and BMP Q4hrs.  - Start D5 1/2 NS at once glucose drops below 200mg/dl.  - Transition to sub cut insulin once Ag<12, bicarb >15 and able to eat. Continue insulin drip for 2 hours after SQ insulin  - Repeat BMP 4 hours after stopping insulin drip.    Lactic acidosis 2/2 sepsis/ hypovolemic state  Plan  Repeat lactate 2 hourly while ongoing fluids

## 2025-07-23 LAB
ACB COMPLEX DNA BLD POS QL NAA+NON-PROBE: NOT DETECTED
ACCESSION NUMBER, LLC1M: ABNORMAL
AMPHET UR QL SCN: NEGATIVE
AMPHET UR QL SCN: NEGATIVE
ANION GAP SERPL CALC-SCNC: 4 MMOL/L (ref 2–12)
ANION GAP SERPL CALC-SCNC: 5 MMOL/L (ref 2–12)
ANION GAP SERPL CALC-SCNC: 6 MMOL/L (ref 2–12)
B FRAGILIS DNA BLD POS QL NAA+NON-PROBE: NOT DETECTED
BARBITURATES UR QL SCN: NEGATIVE
BARBITURATES UR QL SCN: NEGATIVE
BENZODIAZ UR QL: POSITIVE
BENZODIAZ UR QL: POSITIVE
BIOFIRE TEST COMMENT: ABNORMAL
BUN SERPL-MCNC: 34 MG/DL (ref 6–20)
BUN SERPL-MCNC: 37 MG/DL (ref 6–20)
BUN SERPL-MCNC: 39 MG/DL (ref 6–20)
BUN/CREAT SERPL: 27 (ref 12–20)
BUN/CREAT SERPL: 30 (ref 12–20)
BUN/CREAT SERPL: 30 (ref 12–20)
C ALBICANS DNA BLD POS QL NAA+NON-PROBE: NOT DETECTED
C AURIS DNA BLD POS QL NAA+NON-PROBE: NOT DETECTED
C GATTII+NEOFOR DNA BLD POS QL NAA+N-PRB: NOT DETECTED
C GLABRATA DNA BLD POS QL NAA+NON-PROBE: NOT DETECTED
C KRUSEI DNA BLD POS QL NAA+NON-PROBE: NOT DETECTED
C PARAP DNA BLD POS QL NAA+NON-PROBE: NOT DETECTED
C TROPICLS DNA BLD POS QL NAA+NON-PROBE: NOT DETECTED
CALCIUM SERPL-MCNC: 8.1 MG/DL (ref 8.5–10.1)
CALCIUM SERPL-MCNC: 8.4 MG/DL (ref 8.5–10.1)
CALCIUM SERPL-MCNC: 9.1 MG/DL (ref 8.5–10.1)
CANNABINOIDS UR QL SCN: POSITIVE
CANNABINOIDS UR QL SCN: POSITIVE
CHLORIDE SERPL-SCNC: 115 MMOL/L (ref 97–108)
CHLORIDE SERPL-SCNC: 117 MMOL/L (ref 97–108)
CHLORIDE SERPL-SCNC: 119 MMOL/L (ref 97–108)
CO2 SERPL-SCNC: 22 MMOL/L (ref 21–32)
CO2 SERPL-SCNC: 24 MMOL/L (ref 21–32)
CO2 SERPL-SCNC: 24 MMOL/L (ref 21–32)
COCAINE UR QL SCN: NEGATIVE
COCAINE UR QL SCN: NEGATIVE
CREAT SERPL-MCNC: 1.12 MG/DL (ref 0.7–1.3)
CREAT SERPL-MCNC: 1.25 MG/DL (ref 0.7–1.3)
CREAT SERPL-MCNC: 1.45 MG/DL (ref 0.7–1.3)
E CLOAC COMP DNA BLD POS NAA+NON-PROBE: NOT DETECTED
E COLI DNA BLD POS QL NAA+NON-PROBE: NOT DETECTED
E FAECALIS DNA BLD POS QL NAA+NON-PROBE: NOT DETECTED
E FAECIUM DNA BLD POS QL NAA+NON-PROBE: NOT DETECTED
ENTEROBACTERALES DNA BLD POS NAA+N-PRB: NOT DETECTED
ERYTHROCYTE [DISTWIDTH] IN BLOOD BY AUTOMATED COUNT: 13.9 % (ref 11.5–14.5)
GLUCOSE BLD STRIP.AUTO-MCNC: 145 MG/DL (ref 65–117)
GLUCOSE BLD STRIP.AUTO-MCNC: 181 MG/DL (ref 65–117)
GLUCOSE BLD STRIP.AUTO-MCNC: 215 MG/DL (ref 65–117)
GLUCOSE BLD STRIP.AUTO-MCNC: 219 MG/DL (ref 65–117)
GLUCOSE BLD STRIP.AUTO-MCNC: 219 MG/DL (ref 65–117)
GLUCOSE BLD STRIP.AUTO-MCNC: 220 MG/DL (ref 65–117)
GLUCOSE BLD STRIP.AUTO-MCNC: 220 MG/DL (ref 65–117)
GLUCOSE BLD STRIP.AUTO-MCNC: 226 MG/DL (ref 65–117)
GLUCOSE BLD STRIP.AUTO-MCNC: 238 MG/DL (ref 65–117)
GLUCOSE BLD STRIP.AUTO-MCNC: 244 MG/DL (ref 65–117)
GLUCOSE BLD STRIP.AUTO-MCNC: 253 MG/DL (ref 65–117)
GLUCOSE BLD STRIP.AUTO-MCNC: 285 MG/DL (ref 65–117)
GLUCOSE BLD STRIP.AUTO-MCNC: 335 MG/DL (ref 65–117)
GLUCOSE BLD STRIP.AUTO-MCNC: 391 MG/DL (ref 65–117)
GLUCOSE SERPL-MCNC: 156 MG/DL (ref 65–100)
GLUCOSE SERPL-MCNC: 190 MG/DL (ref 65–100)
GLUCOSE SERPL-MCNC: 239 MG/DL (ref 65–100)
GP B STREP DNA BLD POS QL NAA+NON-PROBE: NOT DETECTED
HAEM INFLU DNA BLD POS QL NAA+NON-PROBE: NOT DETECTED
HCT VFR BLD AUTO: 34.1 % (ref 36.6–50.3)
HGB BLD-MCNC: 11.2 G/DL (ref 12.1–17)
K OXYTOCA DNA BLD POS QL NAA+NON-PROBE: NOT DETECTED
KLEBSIELLA SP DNA BLD POS QL NAA+NON-PRB: NOT DETECTED
KLEBSIELLA SP DNA BLD POS QL NAA+NON-PRB: NOT DETECTED
L MONOCYTOG DNA BLD POS QL NAA+NON-PROBE: NOT DETECTED
LACTATE SERPL-SCNC: 0.8 MMOL/L (ref 0.4–2)
LACTATE SERPL-SCNC: 2.2 MMOL/L (ref 0.4–2)
Lab: ABNORMAL
Lab: ABNORMAL
MAGNESIUM SERPL-MCNC: 1.9 MG/DL (ref 1.6–2.4)
MAGNESIUM SERPL-MCNC: 2.2 MG/DL (ref 1.6–2.4)
MAGNESIUM SERPL-MCNC: 2.4 MG/DL (ref 1.6–2.4)
MCH RBC QN AUTO: 28.9 PG (ref 26–34)
MCHC RBC AUTO-ENTMCNC: 32.8 G/DL (ref 30–36.5)
MCV RBC AUTO: 87.9 FL (ref 80–99)
MECA+MECC+MREJ ISLT/SPM QL: NOT DETECTED
METHADONE UR QL: NEGATIVE
METHADONE UR QL: NEGATIVE
N MEN DNA BLD POS QL NAA+NON-PROBE: NOT DETECTED
NRBC # BLD: 0 K/UL (ref 0–0.01)
NRBC BLD-RTO: 0 PER 100 WBC
OPIATES UR QL: NEGATIVE
OPIATES UR QL: NEGATIVE
P AERUGINOSA DNA BLD POS NAA+NON-PROBE: NOT DETECTED
PCP UR QL: NEGATIVE
PCP UR QL: NEGATIVE
PHOSPHATE SERPL-MCNC: 2.9 MG/DL (ref 2.6–4.7)
PHOSPHATE SERPL-MCNC: 3.4 MG/DL (ref 2.6–4.7)
PHOSPHATE SERPL-MCNC: 3.7 MG/DL (ref 2.6–4.7)
PLATELET # BLD AUTO: 227 K/UL (ref 150–400)
PMV BLD AUTO: 10.3 FL (ref 8.9–12.9)
POTASSIUM SERPL-SCNC: 3.6 MMOL/L (ref 3.5–5.1)
POTASSIUM SERPL-SCNC: 3.8 MMOL/L (ref 3.5–5.1)
POTASSIUM SERPL-SCNC: 3.8 MMOL/L (ref 3.5–5.1)
PROTEUS SP DNA BLD POS QL NAA+NON-PROBE: NOT DETECTED
RBC # BLD AUTO: 3.88 M/UL (ref 4.1–5.7)
RESISTANT GENE TARGETS: ABNORMAL
S AUREUS DNA BLD POS QL NAA+NON-PROBE: DETECTED
S AUREUS+CONS DNA BLD POS NAA+NON-PROBE: DETECTED
S EPIDERMIDIS DNA BLD POS QL NAA+NON-PRB: NOT DETECTED
S LUGDUNENSIS DNA BLD POS QL NAA+NON-PRB: NOT DETECTED
S MALTOPHILIA DNA BLD POS QL NAA+NON-PRB: NOT DETECTED
S MARCESCENS DNA BLD POS NAA+NON-PROBE: NOT DETECTED
S PNEUM DNA BLD POS QL NAA+NON-PROBE: NOT DETECTED
S PYO DNA BLD POS QL NAA+NON-PROBE: NOT DETECTED
SALMONELLA DNA BLD POS QL NAA+NON-PROBE: NOT DETECTED
SERVICE CMNT-IMP: ABNORMAL
SODIUM SERPL-SCNC: 145 MMOL/L (ref 136–145)
SODIUM SERPL-SCNC: 145 MMOL/L (ref 136–145)
SODIUM SERPL-SCNC: 146 MMOL/L (ref 136–145)
STREPTOCOCCUS DNA BLD POS NAA+NON-PROBE: NOT DETECTED
WBC # BLD AUTO: 16.6 K/UL (ref 4.1–11.1)

## 2025-07-23 PROCEDURE — 87040 BLOOD CULTURE FOR BACTERIA: CPT

## 2025-07-23 PROCEDURE — 2580000003 HC RX 258: Performed by: STUDENT IN AN ORGANIZED HEALTH CARE EDUCATION/TRAINING PROGRAM

## 2025-07-23 PROCEDURE — 84100 ASSAY OF PHOSPHORUS: CPT

## 2025-07-23 PROCEDURE — 2580000003 HC RX 258: Performed by: HOSPITALIST

## 2025-07-23 PROCEDURE — 2500000003 HC RX 250 WO HCPCS: Performed by: HOSPITALIST

## 2025-07-23 PROCEDURE — 85027 COMPLETE CBC AUTOMATED: CPT

## 2025-07-23 PROCEDURE — 6370000000 HC RX 637 (ALT 250 FOR IP): Performed by: HOSPITALIST

## 2025-07-23 PROCEDURE — 6360000002 HC RX W HCPCS: Performed by: STUDENT IN AN ORGANIZED HEALTH CARE EDUCATION/TRAINING PROGRAM

## 2025-07-23 PROCEDURE — 6360000002 HC RX W HCPCS: Performed by: HOSPITALIST

## 2025-07-23 PROCEDURE — 6370000000 HC RX 637 (ALT 250 FOR IP): Performed by: STUDENT IN AN ORGANIZED HEALTH CARE EDUCATION/TRAINING PROGRAM

## 2025-07-23 PROCEDURE — 2060000000 HC ICU INTERMEDIATE R&B

## 2025-07-23 PROCEDURE — 6370000000 HC RX 637 (ALT 250 FOR IP): Performed by: PHYSICIAN ASSISTANT

## 2025-07-23 PROCEDURE — 6360000002 HC RX W HCPCS: Performed by: NURSE PRACTITIONER

## 2025-07-23 PROCEDURE — 80048 BASIC METABOLIC PNL TOTAL CA: CPT

## 2025-07-23 PROCEDURE — 82962 GLUCOSE BLOOD TEST: CPT

## 2025-07-23 PROCEDURE — 36415 COLL VENOUS BLD VENIPUNCTURE: CPT

## 2025-07-23 PROCEDURE — 80307 DRUG TEST PRSMV CHEM ANLYZR: CPT

## 2025-07-23 PROCEDURE — 83735 ASSAY OF MAGNESIUM: CPT

## 2025-07-23 PROCEDURE — 83605 ASSAY OF LACTIC ACID: CPT

## 2025-07-23 RX ORDER — OXYCODONE HYDROCHLORIDE 5 MG/1
5 TABLET ORAL EVERY 4 HOURS PRN
Refills: 0 | Status: DISCONTINUED | OUTPATIENT
Start: 2025-07-23 | End: 2025-07-30 | Stop reason: HOSPADM

## 2025-07-23 RX ORDER — OXYCODONE HYDROCHLORIDE 5 MG/1
2.5 TABLET ORAL EVERY 6 HOURS PRN
Refills: 0 | Status: DISCONTINUED | OUTPATIENT
Start: 2025-07-23 | End: 2025-07-23

## 2025-07-23 RX ORDER — ATORVASTATIN CALCIUM 40 MG/1
80 TABLET, FILM COATED ORAL EVERY EVENING
Status: DISCONTINUED | OUTPATIENT
Start: 2025-07-23 | End: 2025-07-30 | Stop reason: HOSPADM

## 2025-07-23 RX ORDER — TIMOLOL MALEATE 5 MG/ML
1 SOLUTION/ DROPS OPHTHALMIC DAILY
Status: DISCONTINUED | OUTPATIENT
Start: 2025-07-23 | End: 2025-07-30 | Stop reason: HOSPADM

## 2025-07-23 RX ORDER — PREDNISOLONE ACETATE 10 MG/ML
1 SUSPENSION/ DROPS OPHTHALMIC DAILY
Status: DISCONTINUED | OUTPATIENT
Start: 2025-07-23 | End: 2025-07-30 | Stop reason: HOSPADM

## 2025-07-23 RX ORDER — DULOXETIN HYDROCHLORIDE 30 MG/1
60 CAPSULE, DELAYED RELEASE ORAL NIGHTLY
Status: DISCONTINUED | OUTPATIENT
Start: 2025-07-23 | End: 2025-07-30 | Stop reason: HOSPADM

## 2025-07-23 RX ORDER — LISINOPRIL 40 MG/1
40 TABLET ORAL DAILY
Status: DISCONTINUED | OUTPATIENT
Start: 2025-07-23 | End: 2025-07-30 | Stop reason: HOSPADM

## 2025-07-23 RX ORDER — INSULIN GLARGINE 100 [IU]/ML
0.15 INJECTION, SOLUTION SUBCUTANEOUS DAILY
Status: DISCONTINUED | OUTPATIENT
Start: 2025-07-23 | End: 2025-07-26

## 2025-07-23 RX ORDER — CARVEDILOL 12.5 MG/1
25 TABLET ORAL 2 TIMES DAILY WITH MEALS
Status: DISCONTINUED | OUTPATIENT
Start: 2025-07-23 | End: 2025-07-30 | Stop reason: HOSPADM

## 2025-07-23 RX ORDER — DEXTROSE MONOHYDRATE 100 MG/ML
INJECTION, SOLUTION INTRAVENOUS CONTINUOUS PRN
Status: DISCONTINUED | OUTPATIENT
Start: 2025-07-23 | End: 2025-07-30 | Stop reason: HOSPADM

## 2025-07-23 RX ORDER — GLUCAGON 1 MG/ML
1 KIT INJECTION PRN
Status: DISCONTINUED | OUTPATIENT
Start: 2025-07-23 | End: 2025-07-30 | Stop reason: HOSPADM

## 2025-07-23 RX ORDER — POTASSIUM CHLORIDE 7.45 MG/ML
10 INJECTION INTRAVENOUS ONCE
Status: COMPLETED | OUTPATIENT
Start: 2025-07-23 | End: 2025-07-23

## 2025-07-23 RX ORDER — POTASSIUM CHLORIDE 7.45 MG/ML
10 INJECTION INTRAVENOUS
Status: DISPENSED | OUTPATIENT
Start: 2025-07-23 | End: 2025-07-23

## 2025-07-23 RX ORDER — INSULIN LISPRO 100 [IU]/ML
0-4 INJECTION, SOLUTION INTRAVENOUS; SUBCUTANEOUS
Status: DISCONTINUED | OUTPATIENT
Start: 2025-07-23 | End: 2025-07-27

## 2025-07-23 RX ORDER — INSULIN LISPRO 100 [IU]/ML
5 INJECTION, SOLUTION INTRAVENOUS; SUBCUTANEOUS
Status: DISCONTINUED | OUTPATIENT
Start: 2025-07-23 | End: 2025-07-27

## 2025-07-23 RX ORDER — AMLODIPINE BESYLATE 5 MG/1
10 TABLET ORAL DAILY
Status: DISCONTINUED | OUTPATIENT
Start: 2025-07-23 | End: 2025-07-30 | Stop reason: HOSPADM

## 2025-07-23 RX ORDER — SODIUM CHLORIDE, SODIUM LACTATE, POTASSIUM CHLORIDE, CALCIUM CHLORIDE 600; 310; 30; 20 MG/100ML; MG/100ML; MG/100ML; MG/100ML
INJECTION, SOLUTION INTRAVENOUS CONTINUOUS
Status: ACTIVE | OUTPATIENT
Start: 2025-07-23 | End: 2025-07-24

## 2025-07-23 RX ORDER — OXYCODONE HYDROCHLORIDE 5 MG/1
2.5 TABLET ORAL EVERY 6 HOURS PRN
Refills: 0 | Status: DISCONTINUED | OUTPATIENT
Start: 2025-07-23 | End: 2025-07-30 | Stop reason: HOSPADM

## 2025-07-23 RX ORDER — MAGNESIUM SULFATE 1 G/100ML
1000 INJECTION INTRAVENOUS ONCE
Status: COMPLETED | OUTPATIENT
Start: 2025-07-23 | End: 2025-07-23

## 2025-07-23 RX ADMIN — Medication 1 AMPULE: at 20:17

## 2025-07-23 RX ADMIN — DEXTROSE AND SODIUM CHLORIDE: 5; .45 INJECTION, SOLUTION INTRAVENOUS at 00:10

## 2025-07-23 RX ADMIN — AMLODIPINE BESYLATE 10 MG: 5 TABLET ORAL at 08:53

## 2025-07-23 RX ADMIN — CARVEDILOL 25 MG: 12.5 TABLET, FILM COATED ORAL at 08:53

## 2025-07-23 RX ADMIN — DULOXETINE 60 MG: 30 CAPSULE, DELAYED RELEASE ORAL at 20:02

## 2025-07-23 RX ADMIN — OXYCODONE 5 MG: 5 TABLET ORAL at 20:02

## 2025-07-23 RX ADMIN — PIPERACILLIN AND TAZOBACTAM 3375 MG: 3; .375 INJECTION, POWDER, FOR SOLUTION INTRAVENOUS at 23:05

## 2025-07-23 RX ADMIN — MAGNESIUM SULFATE HEPTAHYDRATE 1000 MG: 1 INJECTION, SOLUTION INTRAVENOUS at 04:54

## 2025-07-23 RX ADMIN — ATORVASTATIN CALCIUM 80 MG: 40 TABLET, FILM COATED ORAL at 17:19

## 2025-07-23 RX ADMIN — PIPERACILLIN AND TAZOBACTAM 3375 MG: 3; .375 INJECTION, POWDER, FOR SOLUTION INTRAVENOUS at 04:53

## 2025-07-23 RX ADMIN — SODIUM CHLORIDE, SODIUM LACTATE, POTASSIUM CHLORIDE, AND CALCIUM CHLORIDE: .6; .31; .03; .02 INJECTION, SOLUTION INTRAVENOUS at 11:02

## 2025-07-23 RX ADMIN — POTASSIUM CHLORIDE 10 MEQ: 7.46 INJECTION, SOLUTION INTRAVENOUS at 09:48

## 2025-07-23 RX ADMIN — ACETAMINOPHEN 650 MG: 325 TABLET ORAL at 09:44

## 2025-07-23 RX ADMIN — SODIUM CHLORIDE, SODIUM LACTATE, POTASSIUM CHLORIDE, AND CALCIUM CHLORIDE: .6; .31; .03; .02 INJECTION, SOLUTION INTRAVENOUS at 20:12

## 2025-07-23 RX ADMIN — INSULIN GLARGINE 16 UNITS: 100 INJECTION, SOLUTION SUBCUTANEOUS at 08:54

## 2025-07-23 RX ADMIN — SODIUM CHLORIDE, PRESERVATIVE FREE 10 ML: 5 INJECTION INTRAVENOUS at 09:50

## 2025-07-23 RX ADMIN — INSULIN LISPRO 5 UNITS: 100 INJECTION, SOLUTION INTRAVENOUS; SUBCUTANEOUS at 17:19

## 2025-07-23 RX ADMIN — PIPERACILLIN AND TAZOBACTAM 3375 MG: 3; .375 INJECTION, POWDER, FOR SOLUTION INTRAVENOUS at 15:34

## 2025-07-23 RX ADMIN — Medication 1 AMPULE: at 08:54

## 2025-07-23 RX ADMIN — POTASSIUM CHLORIDE 10 MEQ: 7.46 INJECTION, SOLUTION INTRAVENOUS at 06:22

## 2025-07-23 RX ADMIN — ENOXAPARIN SODIUM 30 MG: 100 INJECTION SUBCUTANEOUS at 20:02

## 2025-07-23 RX ADMIN — ENOXAPARIN SODIUM 30 MG: 100 INJECTION SUBCUTANEOUS at 08:54

## 2025-07-23 RX ADMIN — INSULIN LISPRO 2 UNITS: 100 INJECTION, SOLUTION INTRAVENOUS; SUBCUTANEOUS at 21:10

## 2025-07-23 RX ADMIN — SODIUM CHLORIDE 5.5 UNITS/HR: 9 INJECTION, SOLUTION INTRAVENOUS at 00:11

## 2025-07-23 RX ADMIN — Medication 2500 MG: at 18:04

## 2025-07-23 RX ADMIN — CARVEDILOL 25 MG: 12.5 TABLET, FILM COATED ORAL at 17:19

## 2025-07-23 RX ADMIN — INSULIN LISPRO 3 UNITS: 100 INJECTION, SOLUTION INTRAVENOUS; SUBCUTANEOUS at 17:19

## 2025-07-23 RX ADMIN — SODIUM CHLORIDE, PRESERVATIVE FREE 10 ML: 5 INJECTION INTRAVENOUS at 20:08

## 2025-07-23 RX ADMIN — DEXTROSE AND SODIUM CHLORIDE: 5; .45 INJECTION, SOLUTION INTRAVENOUS at 07:31

## 2025-07-23 RX ADMIN — OXYCODONE 2.5 MG: 5 TABLET ORAL at 14:01

## 2025-07-23 ASSESSMENT — PAIN DESCRIPTION - ORIENTATION
ORIENTATION: RIGHT;LEFT
ORIENTATION: RIGHT
ORIENTATION: ANTERIOR
ORIENTATION: ANTERIOR

## 2025-07-23 ASSESSMENT — PAIN DESCRIPTION - LOCATION
LOCATION: HEAD;NECK;FOOT
LOCATION: HEAD

## 2025-07-23 ASSESSMENT — PAIN SCALES - GENERAL
PAINLEVEL_OUTOF10: 9
PAINLEVEL_OUTOF10: 8
PAINLEVEL_OUTOF10: 3
PAINLEVEL_OUTOF10: 0
PAINLEVEL_OUTOF10: 4
PAINLEVEL_OUTOF10: 0
PAINLEVEL_OUTOF10: 10
PAINLEVEL_OUTOF10: 8
PAINLEVEL_OUTOF10: 10
PAINLEVEL_OUTOF10: 2

## 2025-07-23 ASSESSMENT — PAIN DESCRIPTION - DESCRIPTORS
DESCRIPTORS: THROBBING
DESCRIPTORS: ACHING;SORE;TENDER;TIGHTNESS
DESCRIPTORS: THROBBING
DESCRIPTORS: ACHING
DESCRIPTORS: ACHING

## 2025-07-23 NOTE — CASE COMMUNICATION
Nursing messaged.   Acute pain related to foot wounds. Not well relieved by oxy 2.5mg q6h. Will order oxy 5mg q4h for severe pain and 2.5mg q6h for moderate pain. Given advanced age and mild renal insufficiency, monitor for sedation and/or CATERINA.    Non-billable note

## 2025-07-23 NOTE — WOUND CARE
Wound care consult for multiple skin tears and possible pressure injury to the toes - POA. Chart reviewed and patient assessed.   Pt. Is 75 years old and he was admitted due to DKA, Acute encephalopathy. History of diabetes, receives care at [facility]. Unclear if patient has been compliant to home insulin regimen.   Assessment of the skin / wounds:  skin tears have been dressed by the staff with the skin tear protocol instructions and we can continue that daily.  The left heel wound is a healing blister with a dry scab - not a pressure injury. Pt. Has numerous dry surface wounds on the toes and feet without drainage - just need to keep them clean and dry and float the heel.                         Plan: Wound care orders placed for the skin tear care and the foot care. Turn patient every two hours around the clock and float the heels.   Nadiya Sheppard, RN, BSN, CWON

## 2025-07-24 LAB
ANION GAP SERPL CALC-SCNC: 8 MMOL/L (ref 2–12)
BUN SERPL-MCNC: 22 MG/DL (ref 6–20)
BUN/CREAT SERPL: 23 (ref 12–20)
CALCIUM SERPL-MCNC: 8.2 MG/DL (ref 8.5–10.1)
CHLORIDE SERPL-SCNC: 109 MMOL/L (ref 97–108)
CO2 SERPL-SCNC: 22 MMOL/L (ref 21–32)
CREAT SERPL-MCNC: 0.95 MG/DL (ref 0.7–1.3)
ERYTHROCYTE [DISTWIDTH] IN BLOOD BY AUTOMATED COUNT: 13.7 % (ref 11.5–14.5)
EST. AVERAGE GLUCOSE BLD GHB EST-MCNC: 217 MG/DL
GLUCOSE BLD STRIP.AUTO-MCNC: 143 MG/DL (ref 65–117)
GLUCOSE BLD STRIP.AUTO-MCNC: 234 MG/DL (ref 65–117)
GLUCOSE BLD STRIP.AUTO-MCNC: 270 MG/DL (ref 65–117)
GLUCOSE BLD STRIP.AUTO-MCNC: 270 MG/DL (ref 65–117)
GLUCOSE SERPL-MCNC: 260 MG/DL (ref 65–100)
HBA1C MFR BLD: 9.2 % (ref 4–5.6)
HCT VFR BLD AUTO: 31.6 % (ref 36.6–50.3)
HGB BLD-MCNC: 10.3 G/DL (ref 12.1–17)
MAGNESIUM SERPL-MCNC: 2.1 MG/DL (ref 1.6–2.4)
MCH RBC QN AUTO: 28.9 PG (ref 26–34)
MCHC RBC AUTO-ENTMCNC: 32.6 G/DL (ref 30–36.5)
MCV RBC AUTO: 88.5 FL (ref 80–99)
NRBC # BLD: 0 K/UL (ref 0–0.01)
NRBC BLD-RTO: 0 PER 100 WBC
PHOSPHATE SERPL-MCNC: 2.8 MG/DL (ref 2.6–4.7)
PLATELET # BLD AUTO: 210 K/UL (ref 150–400)
PMV BLD AUTO: 9.6 FL (ref 8.9–12.9)
POTASSIUM SERPL-SCNC: 4.4 MMOL/L (ref 3.5–5.1)
RBC # BLD AUTO: 3.57 M/UL (ref 4.1–5.7)
SERVICE CMNT-IMP: ABNORMAL
SODIUM SERPL-SCNC: 139 MMOL/L (ref 136–145)
WBC # BLD AUTO: 11.1 K/UL (ref 4.1–11.1)

## 2025-07-24 PROCEDURE — 6360000002 HC RX W HCPCS: Performed by: STUDENT IN AN ORGANIZED HEALTH CARE EDUCATION/TRAINING PROGRAM

## 2025-07-24 PROCEDURE — 6370000000 HC RX 637 (ALT 250 FOR IP): Performed by: HOSPITALIST

## 2025-07-24 PROCEDURE — 2500000003 HC RX 250 WO HCPCS: Performed by: HOSPITALIST

## 2025-07-24 PROCEDURE — 97530 THERAPEUTIC ACTIVITIES: CPT | Performed by: PHYSICAL THERAPIST

## 2025-07-24 PROCEDURE — 6370000000 HC RX 637 (ALT 250 FOR IP): Performed by: INTERNAL MEDICINE

## 2025-07-24 PROCEDURE — 2580000003 HC RX 258: Performed by: STUDENT IN AN ORGANIZED HEALTH CARE EDUCATION/TRAINING PROGRAM

## 2025-07-24 PROCEDURE — 36415 COLL VENOUS BLD VENIPUNCTURE: CPT

## 2025-07-24 PROCEDURE — 97161 PT EVAL LOW COMPLEX 20 MIN: CPT | Performed by: PHYSICAL THERAPIST

## 2025-07-24 PROCEDURE — 83735 ASSAY OF MAGNESIUM: CPT

## 2025-07-24 PROCEDURE — 2060000000 HC ICU INTERMEDIATE R&B

## 2025-07-24 PROCEDURE — 80048 BASIC METABOLIC PNL TOTAL CA: CPT

## 2025-07-24 PROCEDURE — 83036 HEMOGLOBIN GLYCOSYLATED A1C: CPT

## 2025-07-24 PROCEDURE — 84100 ASSAY OF PHOSPHORUS: CPT

## 2025-07-24 PROCEDURE — 82962 GLUCOSE BLOOD TEST: CPT

## 2025-07-24 PROCEDURE — 6370000000 HC RX 637 (ALT 250 FOR IP): Performed by: PHYSICIAN ASSISTANT

## 2025-07-24 PROCEDURE — 85027 COMPLETE CBC AUTOMATED: CPT

## 2025-07-24 PROCEDURE — 6360000002 HC RX W HCPCS: Performed by: HOSPITALIST

## 2025-07-24 PROCEDURE — 6370000000 HC RX 637 (ALT 250 FOR IP): Performed by: STUDENT IN AN ORGANIZED HEALTH CARE EDUCATION/TRAINING PROGRAM

## 2025-07-24 RX ADMIN — SODIUM CHLORIDE 1500 MG: 0.9 INJECTION, SOLUTION INTRAVENOUS at 18:35

## 2025-07-24 RX ADMIN — OXYCODONE 5 MG: 5 TABLET ORAL at 21:37

## 2025-07-24 RX ADMIN — TIMOLOL MALEATE 1 DROP: 5 SOLUTION OPHTHALMIC at 08:20

## 2025-07-24 RX ADMIN — PREDNISOLONE ACETATE 1 DROP: 10 SUSPENSION/ DROPS OPHTHALMIC at 08:21

## 2025-07-24 RX ADMIN — INSULIN LISPRO 2 UNITS: 100 INJECTION, SOLUTION INTRAVENOUS; SUBCUTANEOUS at 08:19

## 2025-07-24 RX ADMIN — INSULIN LISPRO 5 UNITS: 100 INJECTION, SOLUTION INTRAVENOUS; SUBCUTANEOUS at 17:02

## 2025-07-24 RX ADMIN — INSULIN LISPRO 2 UNITS: 100 INJECTION, SOLUTION INTRAVENOUS; SUBCUTANEOUS at 17:01

## 2025-07-24 RX ADMIN — OXYCODONE 5 MG: 5 TABLET ORAL at 01:28

## 2025-07-24 RX ADMIN — OXYCODONE 5 MG: 5 TABLET ORAL at 06:08

## 2025-07-24 RX ADMIN — SODIUM CHLORIDE, PRESERVATIVE FREE 10 ML: 5 INJECTION INTRAVENOUS at 08:21

## 2025-07-24 RX ADMIN — PIPERACILLIN AND TAZOBACTAM 3375 MG: 3; .375 INJECTION, POWDER, FOR SOLUTION INTRAVENOUS at 14:28

## 2025-07-24 RX ADMIN — SODIUM CHLORIDE, PRESERVATIVE FREE 10 ML: 5 INJECTION INTRAVENOUS at 21:42

## 2025-07-24 RX ADMIN — INSULIN LISPRO 5 UNITS: 100 INJECTION, SOLUTION INTRAVENOUS; SUBCUTANEOUS at 08:20

## 2025-07-24 RX ADMIN — PIPERACILLIN AND TAZOBACTAM 3375 MG: 3; .375 INJECTION, POWDER, FOR SOLUTION INTRAVENOUS at 23:04

## 2025-07-24 RX ADMIN — DICLOFENAC SODIUM 2 G: 10 GEL TOPICAL at 21:44

## 2025-07-24 RX ADMIN — ATORVASTATIN CALCIUM 80 MG: 40 TABLET, FILM COATED ORAL at 17:01

## 2025-07-24 RX ADMIN — AMLODIPINE BESYLATE 10 MG: 5 TABLET ORAL at 08:20

## 2025-07-24 RX ADMIN — INSULIN LISPRO 5 UNITS: 100 INJECTION, SOLUTION INTRAVENOUS; SUBCUTANEOUS at 12:21

## 2025-07-24 RX ADMIN — TIZANIDINE 4 MG: 4 TABLET ORAL at 21:39

## 2025-07-24 RX ADMIN — TIZANIDINE 4 MG: 4 TABLET ORAL at 13:16

## 2025-07-24 RX ADMIN — DICLOFENAC SODIUM 2 G: 10 GEL TOPICAL at 13:10

## 2025-07-24 RX ADMIN — CARVEDILOL 25 MG: 12.5 TABLET, FILM COATED ORAL at 17:01

## 2025-07-24 RX ADMIN — ENOXAPARIN SODIUM 30 MG: 100 INJECTION SUBCUTANEOUS at 08:19

## 2025-07-24 RX ADMIN — Medication 1 AMPULE: at 08:21

## 2025-07-24 RX ADMIN — INSULIN LISPRO 2 UNITS: 100 INJECTION, SOLUTION INTRAVENOUS; SUBCUTANEOUS at 12:20

## 2025-07-24 RX ADMIN — INSULIN GLARGINE 16 UNITS: 100 INJECTION, SOLUTION SUBCUTANEOUS at 08:20

## 2025-07-24 RX ADMIN — PIPERACILLIN AND TAZOBACTAM 3375 MG: 3; .375 INJECTION, POWDER, FOR SOLUTION INTRAVENOUS at 06:12

## 2025-07-24 RX ADMIN — CARVEDILOL 25 MG: 12.5 TABLET, FILM COATED ORAL at 08:19

## 2025-07-24 RX ADMIN — DULOXETINE 60 MG: 30 CAPSULE, DELAYED RELEASE ORAL at 21:37

## 2025-07-24 RX ADMIN — Medication 1 AMPULE: at 21:42

## 2025-07-24 RX ADMIN — OXYCODONE 5 MG: 5 TABLET ORAL at 14:26

## 2025-07-24 RX ADMIN — ENOXAPARIN SODIUM 30 MG: 100 INJECTION SUBCUTANEOUS at 21:37

## 2025-07-24 ASSESSMENT — PAIN SCALES - GENERAL
PAINLEVEL_OUTOF10: 8
PAINLEVEL_OUTOF10: 2
PAINLEVEL_OUTOF10: 0
PAINLEVEL_OUTOF10: 9
PAINLEVEL_OUTOF10: 8
PAINLEVEL_OUTOF10: 9
PAINLEVEL_OUTOF10: 4
PAINLEVEL_OUTOF10: 3
PAINLEVEL_OUTOF10: 0
PAINLEVEL_OUTOF10: 3
PAINLEVEL_OUTOF10: 0
PAINLEVEL_OUTOF10: 9
PAINLEVEL_OUTOF10: 2
PAINLEVEL_OUTOF10: 9
PAINLEVEL_OUTOF10: 0

## 2025-07-24 ASSESSMENT — PAIN SCALES - WONG BAKER
WONGBAKER_NUMERICALRESPONSE: NO HURT
WONGBAKER_NUMERICALRESPONSE: HURTS A LITTLE BIT

## 2025-07-24 ASSESSMENT — PAIN DESCRIPTION - LOCATION
LOCATION: NECK
LOCATION: HEAD;BACK;FOOT
LOCATION: HEAD
LOCATION: NECK;HEAD;BACK
LOCATION: HEAD;NECK
LOCATION: NECK
LOCATION: HEAD;NECK;FOOT

## 2025-07-24 ASSESSMENT — PAIN DESCRIPTION - ORIENTATION
ORIENTATION: RIGHT;LEFT
ORIENTATION: MID
ORIENTATION: RIGHT;LEFT;POSTERIOR
ORIENTATION: LEFT
ORIENTATION: LEFT
ORIENTATION: MID
ORIENTATION: MID
ORIENTATION: RIGHT;LEFT;POSTERIOR

## 2025-07-24 ASSESSMENT — PAIN DESCRIPTION - DESCRIPTORS
DESCRIPTORS: THROBBING
DESCRIPTORS: THROBBING
DESCRIPTORS: ACHING;DISCOMFORT;PRESSURE
DESCRIPTORS: ACHING;DISCOMFORT
DESCRIPTORS: ACHING;SQUEEZING;SORE
DESCRIPTORS: ACHING;TENDER;SORE
DESCRIPTORS: THROBBING

## 2025-07-24 ASSESSMENT — PAIN - FUNCTIONAL ASSESSMENT
PAIN_FUNCTIONAL_ASSESSMENT: ACTIVITIES ARE NOT PREVENTED

## 2025-07-24 ASSESSMENT — PAIN DESCRIPTION - PAIN TYPE
TYPE: ACUTE PAIN

## 2025-07-24 ASSESSMENT — PAIN DESCRIPTION - DIRECTION
RADIATING_TOWARDS: BACK
RADIATING_TOWARDS: BACK

## 2025-07-24 ASSESSMENT — PAIN DESCRIPTION - ONSET
ONSET: GRADUAL

## 2025-07-24 ASSESSMENT — PAIN DESCRIPTION - FREQUENCY
FREQUENCY: INTERMITTENT

## 2025-07-24 NOTE — CARE COORDINATION
Care Management Initial Assessment       RUR:  13  Readmission? No  1st IM letter given? No  1st  letter given: No         07/24/25 0932   Service Assessment   Patient Orientation Alert and Oriented;Person;Place;Situation;Self   Cognition Alert   History Provided By Patient   Primary Caregiver Other (Comment)  (niece comes over 3x a week to assist)   Support Systems Children;Family Members  (daughter, sisters, niece, cousin)   Patient's Healthcare Decision Maker is: Patient Declined (Legal Next of Kin Remains as Decision Maker)   PCP Verified by CM Yes  ([facility])   Last Visit to PCP Within last 3 months   Prior Functional Level Assistance with the following:   Current Functional Level Assistance with the following:   Can patient return to prior living arrangement Yes   Ability to make needs known: Good   Family able to assist with home care needs: Yes  (niece comes by 3x a week)   Would you like for me to discuss the discharge plan with any other family members/significant others, and if so, who? Yes  (daughter, Rosa)   Financial Resources  ([facility])   Community Resources None   Social/Functional History   Lives With Alone   Type of Home House  (2 fl home with 6 daphne)   Home Layout Two level   Home Equipment Rollator;Wheelchair - Manual;Crutches;Walker - Rolling   Active  No   Discharge Planning   Type of Residence House         CM met with pt at bedside to introduce self/role, verify demographics and complete initial assessment.  Pt lives alone and has a supportive niece who comes by 3x a week.  Client shares that he needs some assistance with his ADLs/IADLs.  Pt has a rollator, crutches, RW and w/c.  Pt has a hx of HH but reports it was no help.  Pt denied a hx of SNF/IPR.  Pt gets all meds from the [facility].  Pt's family can transport at d/c.  CM asked about transferring to the [facility] and pt would like to.  CM will send appropriate forms.    9:50 a.m. CM faxed [place]'s Preference to Transfer from [facility]

## 2025-07-25 ENCOUNTER — APPOINTMENT (OUTPATIENT)
Facility: HOSPITAL | Age: 75
DRG: 871 | End: 2025-07-25
Attending: INTERNAL MEDICINE
Payer: OTHER GOVERNMENT

## 2025-07-25 LAB
ANION GAP SERPL CALC-SCNC: 3 MMOL/L (ref 2–12)
BUN SERPL-MCNC: 16 MG/DL (ref 6–20)
BUN/CREAT SERPL: 22 (ref 12–20)
CALCIUM SERPL-MCNC: 8.8 MG/DL (ref 8.5–10.1)
CHLORIDE SERPL-SCNC: 106 MMOL/L (ref 97–108)
CK SERPL-CCNC: 124 U/L (ref 39–308)
CO2 SERPL-SCNC: 27 MMOL/L (ref 21–32)
CREAT SERPL-MCNC: 0.72 MG/DL (ref 0.7–1.3)
ECHO AO ASC DIAM: 2.9 CM
ECHO AO ASCENDING AORTA INDEX: 1.28 CM/M2
ECHO AO ROOT DIAM: 3.6 CM
ECHO AO ROOT INDEX: 1.59 CM/M2
ECHO AV AREA PEAK VELOCITY: 1.8 CM2
ECHO AV AREA VTI: 1.8 CM2
ECHO AV AREA/BSA PEAK VELOCITY: 0.8 CM2/M2
ECHO AV AREA/BSA VTI: 0.8 CM2/M2
ECHO AV MEAN GRADIENT: 5 MMHG
ECHO AV MEAN VELOCITY: 1.1 M/S
ECHO AV PEAK GRADIENT: 10 MMHG
ECHO AV PEAK VELOCITY: 1.6 M/S
ECHO AV VELOCITY RATIO: 0.56
ECHO AV VTI: 35.5 CM
ECHO BSA: 2.32 M2
ECHO LA DIAMETER INDEX: 1.73 CM/M2
ECHO LA DIAMETER: 3.9 CM
ECHO LA TO AORTIC ROOT RATIO: 1.08
ECHO LA VOL A-L A2C: 42 ML (ref 18–58)
ECHO LA VOL A-L A4C: 24 ML (ref 18–58)
ECHO LA VOL MOD A2C: 35 ML (ref 18–58)
ECHO LA VOL MOD A4C: 24 ML (ref 18–58)
ECHO LA VOLUME AREA LENGTH: 33 ML
ECHO LA VOLUME INDEX A-L A2C: 19 ML/M2 (ref 16–34)
ECHO LA VOLUME INDEX A-L A4C: 11 ML/M2 (ref 16–34)
ECHO LA VOLUME INDEX AREA LENGTH: 15 ML/M2 (ref 16–34)
ECHO LA VOLUME INDEX MOD A2C: 15 ML/M2 (ref 16–34)
ECHO LA VOLUME INDEX MOD A4C: 11 ML/M2 (ref 16–34)
ECHO LV E' LATERAL VELOCITY: 7.81 CM/S
ECHO LV E' SEPTAL VELOCITY: 5.91 CM/S
ECHO LV EF PHYSICIAN: 58 %
ECHO LV FRACTIONAL SHORTENING: 40 % (ref 28–44)
ECHO LV INTERNAL DIMENSION DIASTOLE INDEX: 2.08 CM/M2
ECHO LV INTERNAL DIMENSION DIASTOLIC: 4.7 CM (ref 4.2–5.9)
ECHO LV INTERNAL DIMENSION SYSTOLIC INDEX: 1.24 CM/M2
ECHO LV INTERNAL DIMENSION SYSTOLIC: 2.8 CM
ECHO LV ISOVOLUMETRIC RELAXATION TIME (IVRT): 95.2 MS
ECHO LV IVSD: 1.1 CM (ref 0.6–1)
ECHO LV MASS 2D: 175.8 G (ref 88–224)
ECHO LV MASS INDEX 2D: 77.8 G/M2 (ref 49–115)
ECHO LV POSTERIOR WALL DIASTOLIC: 1 CM (ref 0.6–1)
ECHO LV RELATIVE WALL THICKNESS RATIO: 0.43
ECHO LVOT AREA: 3.5 CM2
ECHO LVOT AV VTI INDEX: 0.54
ECHO LVOT DIAM: 2.1 CM
ECHO LVOT MEAN GRADIENT: 1 MMHG
ECHO LVOT PEAK GRADIENT: 3 MMHG
ECHO LVOT PEAK VELOCITY: 0.9 M/S
ECHO LVOT STROKE VOLUME INDEX: 29.1 ML/M2
ECHO LVOT SV: 65.8 ML
ECHO LVOT VTI: 19 CM
ECHO MV A VELOCITY: 0.82 M/S
ECHO MV E DECELERATION TIME (DT): 258.7 MS
ECHO MV E VELOCITY: 0.72 M/S
ECHO MV E/A RATIO: 0.88
ECHO MV E/E' LATERAL: 9.22
ECHO MV E/E' RATIO (AVERAGED): 10.7
ECHO MV E/E' SEPTAL: 12.18
ECHO RV FREE WALL PEAK S': 8.2 CM/S
ECHO RV INTERNAL DIMENSION: 3.1 CM
ECHO RV LONGITUDINAL DIMENSION: 3.2 CM
ECHO RV TAPSE: 2 CM (ref 1.7–?)
ECHO TV REGURGITANT MAX VELOCITY: 2.41 M/S
ECHO TV REGURGITANT PEAK GRADIENT: 23 MMHG
ERYTHROCYTE [DISTWIDTH] IN BLOOD BY AUTOMATED COUNT: 13.3 % (ref 11.5–14.5)
EST. AVERAGE GLUCOSE BLD GHB EST-MCNC: 223 MG/DL
GLUCOSE BLD STRIP.AUTO-MCNC: 196 MG/DL (ref 65–117)
GLUCOSE BLD STRIP.AUTO-MCNC: 208 MG/DL (ref 65–117)
GLUCOSE BLD STRIP.AUTO-MCNC: 231 MG/DL (ref 65–117)
GLUCOSE BLD STRIP.AUTO-MCNC: 249 MG/DL (ref 65–117)
GLUCOSE SERPL-MCNC: 173 MG/DL (ref 65–100)
HBA1C MFR BLD: 9.4 % (ref 4–5.6)
HCT VFR BLD AUTO: 35.6 % (ref 36.6–50.3)
HGB BLD-MCNC: 11.9 G/DL (ref 12.1–17)
MAGNESIUM SERPL-MCNC: 2 MG/DL (ref 1.6–2.4)
MCH RBC QN AUTO: 28.9 PG (ref 26–34)
MCHC RBC AUTO-ENTMCNC: 33.4 G/DL (ref 30–36.5)
MCV RBC AUTO: 86.4 FL (ref 80–99)
NRBC # BLD: 0 K/UL (ref 0–0.01)
NRBC BLD-RTO: 0 PER 100 WBC
PHOSPHATE SERPL-MCNC: 2.5 MG/DL (ref 2.6–4.7)
PLATELET # BLD AUTO: 207 K/UL (ref 150–400)
PMV BLD AUTO: 9.6 FL (ref 8.9–12.9)
POTASSIUM SERPL-SCNC: 4.3 MMOL/L (ref 3.5–5.1)
RBC # BLD AUTO: 4.12 M/UL (ref 4.1–5.7)
SERVICE CMNT-IMP: ABNORMAL
SODIUM SERPL-SCNC: 136 MMOL/L (ref 136–145)
WBC # BLD AUTO: 6.5 K/UL (ref 4.1–11.1)

## 2025-07-25 PROCEDURE — 83735 ASSAY OF MAGNESIUM: CPT

## 2025-07-25 PROCEDURE — 6360000002 HC RX W HCPCS: Performed by: INTERNAL MEDICINE

## 2025-07-25 PROCEDURE — 6360000002 HC RX W HCPCS: Performed by: HOSPITALIST

## 2025-07-25 PROCEDURE — 6370000000 HC RX 637 (ALT 250 FOR IP): Performed by: HOSPITALIST

## 2025-07-25 PROCEDURE — 36415 COLL VENOUS BLD VENIPUNCTURE: CPT

## 2025-07-25 PROCEDURE — 2060000000 HC ICU INTERMEDIATE R&B

## 2025-07-25 PROCEDURE — 6360000002 HC RX W HCPCS: Performed by: STUDENT IN AN ORGANIZED HEALTH CARE EDUCATION/TRAINING PROGRAM

## 2025-07-25 PROCEDURE — 82550 ASSAY OF CK (CPK): CPT

## 2025-07-25 PROCEDURE — 6370000000 HC RX 637 (ALT 250 FOR IP): Performed by: PHYSICIAN ASSISTANT

## 2025-07-25 PROCEDURE — 80048 BASIC METABOLIC PNL TOTAL CA: CPT

## 2025-07-25 PROCEDURE — 85027 COMPLETE CBC AUTOMATED: CPT

## 2025-07-25 PROCEDURE — 2500000003 HC RX 250 WO HCPCS: Performed by: INTERNAL MEDICINE

## 2025-07-25 PROCEDURE — 83036 HEMOGLOBIN GLYCOSYLATED A1C: CPT

## 2025-07-25 PROCEDURE — 82962 GLUCOSE BLOOD TEST: CPT

## 2025-07-25 PROCEDURE — 93321 DOPPLER ECHO F-UP/LMTD STD: CPT

## 2025-07-25 PROCEDURE — 84100 ASSAY OF PHOSPHORUS: CPT

## 2025-07-25 PROCEDURE — 2580000003 HC RX 258: Performed by: STUDENT IN AN ORGANIZED HEALTH CARE EDUCATION/TRAINING PROGRAM

## 2025-07-25 PROCEDURE — 2500000003 HC RX 250 WO HCPCS: Performed by: HOSPITALIST

## 2025-07-25 PROCEDURE — 6370000000 HC RX 637 (ALT 250 FOR IP): Performed by: INTERNAL MEDICINE

## 2025-07-25 RX ORDER — MECLIZINE HCL 12.5 MG 12.5 MG/1
12.5 TABLET ORAL 3 TIMES DAILY PRN
Status: DISCONTINUED | OUTPATIENT
Start: 2025-07-25 | End: 2025-07-26

## 2025-07-25 RX ORDER — PREDNISONE 20 MG/1
40 TABLET ORAL DAILY
Status: COMPLETED | OUTPATIENT
Start: 2025-07-25 | End: 2025-07-29

## 2025-07-25 RX ADMIN — INSULIN LISPRO 1 UNITS: 100 INJECTION, SOLUTION INTRAVENOUS; SUBCUTANEOUS at 17:32

## 2025-07-25 RX ADMIN — INSULIN LISPRO 1 UNITS: 100 INJECTION, SOLUTION INTRAVENOUS; SUBCUTANEOUS at 21:24

## 2025-07-25 RX ADMIN — OXYCODONE 5 MG: 5 TABLET ORAL at 21:25

## 2025-07-25 RX ADMIN — OXYCODONE 5 MG: 5 TABLET ORAL at 06:14

## 2025-07-25 RX ADMIN — MECLIZINE 12.5 MG: 12.5 TABLET ORAL at 08:52

## 2025-07-25 RX ADMIN — ENOXAPARIN SODIUM 30 MG: 100 INJECTION SUBCUTANEOUS at 21:23

## 2025-07-25 RX ADMIN — OXYCODONE 5 MG: 5 TABLET ORAL at 15:06

## 2025-07-25 RX ADMIN — SODIUM CHLORIDE, PRESERVATIVE FREE 10 ML: 5 INJECTION INTRAVENOUS at 21:27

## 2025-07-25 RX ADMIN — INSULIN LISPRO 1 UNITS: 100 INJECTION, SOLUTION INTRAVENOUS; SUBCUTANEOUS at 12:29

## 2025-07-25 RX ADMIN — PIPERACILLIN AND TAZOBACTAM 3375 MG: 3; .375 INJECTION, POWDER, FOR SOLUTION INTRAVENOUS at 15:08

## 2025-07-25 RX ADMIN — INSULIN LISPRO 5 UNITS: 100 INJECTION, SOLUTION INTRAVENOUS; SUBCUTANEOUS at 08:59

## 2025-07-25 RX ADMIN — DICLOFENAC SODIUM 2 G: 10 GEL TOPICAL at 21:27

## 2025-07-25 RX ADMIN — INSULIN LISPRO 1 UNITS: 100 INJECTION, SOLUTION INTRAVENOUS; SUBCUTANEOUS at 08:58

## 2025-07-25 RX ADMIN — TIMOLOL MALEATE 1 DROP: 5 SOLUTION OPHTHALMIC at 10:47

## 2025-07-25 RX ADMIN — WATER 2000 MG: 1 INJECTION INTRAMUSCULAR; INTRAVENOUS; SUBCUTANEOUS at 16:13

## 2025-07-25 RX ADMIN — INSULIN GLARGINE 16 UNITS: 100 INJECTION, SOLUTION SUBCUTANEOUS at 08:59

## 2025-07-25 RX ADMIN — ATORVASTATIN CALCIUM 80 MG: 40 TABLET, FILM COATED ORAL at 17:31

## 2025-07-25 RX ADMIN — CARVEDILOL 25 MG: 12.5 TABLET, FILM COATED ORAL at 08:54

## 2025-07-25 RX ADMIN — INSULIN LISPRO 5 UNITS: 100 INJECTION, SOLUTION INTRAVENOUS; SUBCUTANEOUS at 12:29

## 2025-07-25 RX ADMIN — INSULIN LISPRO 5 UNITS: 100 INJECTION, SOLUTION INTRAVENOUS; SUBCUTANEOUS at 17:32

## 2025-07-25 RX ADMIN — PIPERACILLIN AND TAZOBACTAM 3375 MG: 3; .375 INJECTION, POWDER, FOR SOLUTION INTRAVENOUS at 06:18

## 2025-07-25 RX ADMIN — PREDNISONE 40 MG: 20 TABLET ORAL at 21:25

## 2025-07-25 RX ADMIN — AMLODIPINE BESYLATE 10 MG: 5 TABLET ORAL at 08:54

## 2025-07-25 RX ADMIN — DICLOFENAC SODIUM 2 G: 10 GEL TOPICAL at 10:45

## 2025-07-25 RX ADMIN — OXYCODONE 5 MG: 5 TABLET ORAL at 02:18

## 2025-07-25 RX ADMIN — CARVEDILOL 25 MG: 12.5 TABLET, FILM COATED ORAL at 17:31

## 2025-07-25 RX ADMIN — ENOXAPARIN SODIUM 30 MG: 100 INJECTION SUBCUTANEOUS at 08:54

## 2025-07-25 RX ADMIN — TIZANIDINE 4 MG: 4 TABLET ORAL at 13:55

## 2025-07-25 RX ADMIN — TIZANIDINE 4 MG: 4 TABLET ORAL at 08:54

## 2025-07-25 RX ADMIN — PREDNISOLONE ACETATE 1 DROP: 10 SUSPENSION/ DROPS OPHTHALMIC at 10:48

## 2025-07-25 RX ADMIN — TIZANIDINE 4 MG: 4 TABLET ORAL at 21:25

## 2025-07-25 RX ADMIN — OXYCODONE 5 MG: 5 TABLET ORAL at 10:46

## 2025-07-25 RX ADMIN — DULOXETINE 60 MG: 30 CAPSULE, DELAYED RELEASE ORAL at 21:24

## 2025-07-25 RX ADMIN — WATER 2000 MG: 1 INJECTION INTRAMUSCULAR; INTRAVENOUS; SUBCUTANEOUS at 23:43

## 2025-07-25 RX ADMIN — SODIUM CHLORIDE, PRESERVATIVE FREE 10 ML: 5 INJECTION INTRAVENOUS at 10:47

## 2025-07-25 ASSESSMENT — PAIN DESCRIPTION - ORIENTATION
ORIENTATION: MID

## 2025-07-25 ASSESSMENT — PAIN SCALES - GENERAL
PAINLEVEL_OUTOF10: 7
PAINLEVEL_OUTOF10: 7
PAINLEVEL_OUTOF10: 9
PAINLEVEL_OUTOF10: 8
PAINLEVEL_OUTOF10: 10
PAINLEVEL_OUTOF10: 8
PAINLEVEL_OUTOF10: 7
PAINLEVEL_OUTOF10: 8
PAINLEVEL_OUTOF10: 8
PAINLEVEL_OUTOF10: 9
PAINLEVEL_OUTOF10: 8
PAINLEVEL_OUTOF10: 6
PAINLEVEL_OUTOF10: 7
PAINLEVEL_OUTOF10: 8
PAINLEVEL_OUTOF10: 8
PAINLEVEL_OUTOF10: 9

## 2025-07-25 ASSESSMENT — PAIN DESCRIPTION - FREQUENCY
FREQUENCY: INTERMITTENT

## 2025-07-25 ASSESSMENT — PAIN DESCRIPTION - LOCATION
LOCATION: NECK;HEAD
LOCATION: HEAD;NECK
LOCATION: NECK;HEAD
LOCATION: HEAD;NECK
LOCATION: BACK;NECK
LOCATION: NECK;HEAD
LOCATION: NECK;HEAD

## 2025-07-25 ASSESSMENT — PAIN DESCRIPTION - DESCRIPTORS
DESCRIPTORS: THROBBING

## 2025-07-25 ASSESSMENT — PAIN - FUNCTIONAL ASSESSMENT
PAIN_FUNCTIONAL_ASSESSMENT: PREVENTS OR INTERFERES WITH ALL ACTIVE AND SOME PASSIVE ACTIVITIES
PAIN_FUNCTIONAL_ASSESSMENT: PREVENTS OR INTERFERES WITH ALL ACTIVE AND SOME PASSIVE ACTIVITIES
PAIN_FUNCTIONAL_ASSESSMENT: PREVENTS OR INTERFERES SOME ACTIVE ACTIVITIES AND ADLS
PAIN_FUNCTIONAL_ASSESSMENT: PREVENTS OR INTERFERES WITH MANY ACTIVE NOT PASSIVE ACTIVITIES

## 2025-07-25 ASSESSMENT — PAIN DESCRIPTION - DIRECTION
RADIATING_TOWARDS: BACK

## 2025-07-25 ASSESSMENT — PAIN DESCRIPTION - ONSET
ONSET: GRADUAL

## 2025-07-25 ASSESSMENT — PAIN DESCRIPTION - PAIN TYPE
TYPE: ACUTE PAIN

## 2025-07-25 NOTE — CARE COORDINATION
Transition of Care Plan:    RUR: 12  Prior Level of Functioning: independent; niece came over 3x a wk to check in  Disposition: SNF; possible transfer to the [facility]  MIKE: 7/28  If SNF or IPR: Date FOC offered:   Date FOC received:   Accepting facility:   Date authorization started with reference number:   Date authorization received and expires:   Follow up appointments:[facility]  DME needed: Pt has a w/c, rollator, crutches, RW.  Transportation at discharge: BLS  IM/IMM Medicare/ letter given: n/a  Is patient a [place] and connected with [facility]?    If yes, was [place] transfer form completed and VA notified? Yes  Caregiver Contact: Rosa yRan (Child)  [phone] (Mobile)   Discharge Caregiver contacted prior to discharge?   Care Conference needed?   Barriers to discharge:     CM messaged attending about needed form to inquire if pt can transfer to the [facility].  NAHID called Lucrecia of the [facility] [phone].  Lucrecia asked for recommended LOC and stated she will call CM back. CM left pt the SNF list for his review in the event he does not transfer.     María Elena Mckenna, LÓPEZ  Care Management, [facility]  [phone]

## 2025-07-26 ENCOUNTER — APPOINTMENT (OUTPATIENT)
Facility: HOSPITAL | Age: 75
DRG: 871 | End: 2025-07-26
Payer: OTHER GOVERNMENT

## 2025-07-26 LAB
ANION GAP SERPL CALC-SCNC: 7 MMOL/L (ref 2–12)
BUN SERPL-MCNC: 17 MG/DL (ref 6–20)
BUN/CREAT SERPL: 20 (ref 12–20)
CALCIUM SERPL-MCNC: 8.9 MG/DL (ref 8.5–10.1)
CHLORIDE SERPL-SCNC: 103 MMOL/L (ref 97–108)
CO2 SERPL-SCNC: 27 MMOL/L (ref 21–32)
CREAT SERPL-MCNC: 0.83 MG/DL (ref 0.7–1.3)
ERYTHROCYTE [DISTWIDTH] IN BLOOD BY AUTOMATED COUNT: 13 % (ref 11.5–14.5)
GLUCOSE BLD STRIP.AUTO-MCNC: 228 MG/DL (ref 65–117)
GLUCOSE BLD STRIP.AUTO-MCNC: 282 MG/DL (ref 65–117)
GLUCOSE BLD STRIP.AUTO-MCNC: 296 MG/DL (ref 65–117)
GLUCOSE BLD STRIP.AUTO-MCNC: 355 MG/DL (ref 65–117)
GLUCOSE SERPL-MCNC: 225 MG/DL (ref 65–100)
HCT VFR BLD AUTO: 36.3 % (ref 36.6–50.3)
HGB BLD-MCNC: 12.1 G/DL (ref 12.1–17)
MAGNESIUM SERPL-MCNC: 2.1 MG/DL (ref 1.6–2.4)
MCH RBC QN AUTO: 28.8 PG (ref 26–34)
MCHC RBC AUTO-ENTMCNC: 33.3 G/DL (ref 30–36.5)
MCV RBC AUTO: 86.4 FL (ref 80–99)
NRBC # BLD: 0 K/UL (ref 0–0.01)
NRBC BLD-RTO: 0 PER 100 WBC
PHOSPHATE SERPL-MCNC: 3.6 MG/DL (ref 2.6–4.7)
PLATELET # BLD AUTO: 220 K/UL (ref 150–400)
PMV BLD AUTO: 9.9 FL (ref 8.9–12.9)
POTASSIUM SERPL-SCNC: 4.5 MMOL/L (ref 3.5–5.1)
RBC # BLD AUTO: 4.2 M/UL (ref 4.1–5.7)
SERVICE CMNT-IMP: ABNORMAL
SODIUM SERPL-SCNC: 137 MMOL/L (ref 136–145)
WBC # BLD AUTO: 6.4 K/UL (ref 4.1–11.1)

## 2025-07-26 PROCEDURE — 97530 THERAPEUTIC ACTIVITIES: CPT | Performed by: OCCUPATIONAL THERAPIST

## 2025-07-26 PROCEDURE — 6370000000 HC RX 637 (ALT 250 FOR IP): Performed by: PHYSICIAN ASSISTANT

## 2025-07-26 PROCEDURE — 6370000000 HC RX 637 (ALT 250 FOR IP): Performed by: INTERNAL MEDICINE

## 2025-07-26 PROCEDURE — 82962 GLUCOSE BLOOD TEST: CPT

## 2025-07-26 PROCEDURE — 6360000002 HC RX W HCPCS: Performed by: HOSPITALIST

## 2025-07-26 PROCEDURE — 70551 MRI BRAIN STEM W/O DYE: CPT

## 2025-07-26 PROCEDURE — 80048 BASIC METABOLIC PNL TOTAL CA: CPT

## 2025-07-26 PROCEDURE — 6370000000 HC RX 637 (ALT 250 FOR IP): Performed by: HOSPITALIST

## 2025-07-26 PROCEDURE — 85027 COMPLETE CBC AUTOMATED: CPT

## 2025-07-26 PROCEDURE — 6360000002 HC RX W HCPCS: Performed by: INTERNAL MEDICINE

## 2025-07-26 PROCEDURE — 97166 OT EVAL MOD COMPLEX 45 MIN: CPT | Performed by: OCCUPATIONAL THERAPIST

## 2025-07-26 PROCEDURE — 2500000003 HC RX 250 WO HCPCS: Performed by: HOSPITALIST

## 2025-07-26 PROCEDURE — 83735 ASSAY OF MAGNESIUM: CPT

## 2025-07-26 PROCEDURE — 2500000003 HC RX 250 WO HCPCS: Performed by: INTERNAL MEDICINE

## 2025-07-26 PROCEDURE — 36415 COLL VENOUS BLD VENIPUNCTURE: CPT

## 2025-07-26 PROCEDURE — 84100 ASSAY OF PHOSPHORUS: CPT

## 2025-07-26 PROCEDURE — 2060000000 HC ICU INTERMEDIATE R&B

## 2025-07-26 RX ORDER — MECLIZINE HCL 12.5 MG 12.5 MG/1
12.5 TABLET ORAL EVERY 6 HOURS SCHEDULED
Status: DISCONTINUED | OUTPATIENT
Start: 2025-07-26 | End: 2025-07-30 | Stop reason: HOSPADM

## 2025-07-26 RX ORDER — INSULIN GLARGINE 100 [IU]/ML
18 INJECTION, SOLUTION SUBCUTANEOUS DAILY
Status: DISCONTINUED | OUTPATIENT
Start: 2025-07-27 | End: 2025-07-27

## 2025-07-26 RX ADMIN — INSULIN LISPRO 1 UNITS: 100 INJECTION, SOLUTION INTRAVENOUS; SUBCUTANEOUS at 09:11

## 2025-07-26 RX ADMIN — INSULIN LISPRO 5 UNITS: 100 INJECTION, SOLUTION INTRAVENOUS; SUBCUTANEOUS at 12:05

## 2025-07-26 RX ADMIN — TIZANIDINE 4 MG: 4 TABLET ORAL at 09:11

## 2025-07-26 RX ADMIN — INSULIN LISPRO 4 UNITS: 100 INJECTION, SOLUTION INTRAVENOUS; SUBCUTANEOUS at 21:11

## 2025-07-26 RX ADMIN — ENOXAPARIN SODIUM 30 MG: 100 INJECTION SUBCUTANEOUS at 09:09

## 2025-07-26 RX ADMIN — CARVEDILOL 25 MG: 12.5 TABLET, FILM COATED ORAL at 09:10

## 2025-07-26 RX ADMIN — OXYCODONE 5 MG: 5 TABLET ORAL at 04:32

## 2025-07-26 RX ADMIN — WATER 2000 MG: 1 INJECTION INTRAMUSCULAR; INTRAVENOUS; SUBCUTANEOUS at 23:18

## 2025-07-26 RX ADMIN — PREDNISOLONE ACETATE 1 DROP: 10 SUSPENSION/ DROPS OPHTHALMIC at 09:18

## 2025-07-26 RX ADMIN — DICLOFENAC SODIUM 2 G: 10 GEL TOPICAL at 09:17

## 2025-07-26 RX ADMIN — INSULIN LISPRO 5 UNITS: 100 INJECTION, SOLUTION INTRAVENOUS; SUBCUTANEOUS at 17:01

## 2025-07-26 RX ADMIN — CARVEDILOL 25 MG: 12.5 TABLET, FILM COATED ORAL at 17:02

## 2025-07-26 RX ADMIN — INSULIN LISPRO 2 UNITS: 100 INJECTION, SOLUTION INTRAVENOUS; SUBCUTANEOUS at 17:01

## 2025-07-26 RX ADMIN — TIZANIDINE 4 MG: 4 TABLET ORAL at 15:38

## 2025-07-26 RX ADMIN — OXYCODONE 5 MG: 5 TABLET ORAL at 09:10

## 2025-07-26 RX ADMIN — DICLOFENAC SODIUM 2 G: 10 GEL TOPICAL at 21:11

## 2025-07-26 RX ADMIN — MECLIZINE 12.5 MG: 12.5 TABLET ORAL at 17:01

## 2025-07-26 RX ADMIN — PREDNISONE 40 MG: 20 TABLET ORAL at 09:10

## 2025-07-26 RX ADMIN — MECLIZINE 12.5 MG: 12.5 TABLET ORAL at 23:09

## 2025-07-26 RX ADMIN — INSULIN GLARGINE 16 UNITS: 100 INJECTION, SOLUTION SUBCUTANEOUS at 09:11

## 2025-07-26 RX ADMIN — ATORVASTATIN CALCIUM 80 MG: 40 TABLET, FILM COATED ORAL at 17:01

## 2025-07-26 RX ADMIN — DULOXETINE 60 MG: 30 CAPSULE, DELAYED RELEASE ORAL at 21:11

## 2025-07-26 RX ADMIN — WATER 2000 MG: 1 INJECTION INTRAMUSCULAR; INTRAVENOUS; SUBCUTANEOUS at 15:39

## 2025-07-26 RX ADMIN — ENOXAPARIN SODIUM 30 MG: 100 INJECTION SUBCUTANEOUS at 21:11

## 2025-07-26 RX ADMIN — SODIUM CHLORIDE, PRESERVATIVE FREE 10 ML: 5 INJECTION INTRAVENOUS at 21:12

## 2025-07-26 RX ADMIN — OXYCODONE 5 MG: 5 TABLET ORAL at 17:13

## 2025-07-26 RX ADMIN — AMLODIPINE BESYLATE 10 MG: 5 TABLET ORAL at 09:11

## 2025-07-26 RX ADMIN — TIMOLOL MALEATE 1 DROP: 5 SOLUTION OPHTHALMIC at 09:18

## 2025-07-26 RX ADMIN — INSULIN LISPRO 2 UNITS: 100 INJECTION, SOLUTION INTRAVENOUS; SUBCUTANEOUS at 12:05

## 2025-07-26 RX ADMIN — OXYCODONE 5 MG: 5 TABLET ORAL at 23:09

## 2025-07-26 RX ADMIN — TIZANIDINE 4 MG: 4 TABLET ORAL at 21:11

## 2025-07-26 RX ADMIN — WATER 2000 MG: 1 INJECTION INTRAMUSCULAR; INTRAVENOUS; SUBCUTANEOUS at 09:09

## 2025-07-26 RX ADMIN — SODIUM CHLORIDE, PRESERVATIVE FREE 10 ML: 5 INJECTION INTRAVENOUS at 09:30

## 2025-07-26 RX ADMIN — INSULIN LISPRO 5 UNITS: 100 INJECTION, SOLUTION INTRAVENOUS; SUBCUTANEOUS at 09:30

## 2025-07-26 ASSESSMENT — PAIN SCALES - GENERAL
PAINLEVEL_OUTOF10: 7
PAINLEVEL_OUTOF10: 9
PAINLEVEL_OUTOF10: 6
PAINLEVEL_OUTOF10: 8
PAINLEVEL_OUTOF10: 6
PAINLEVEL_OUTOF10: 8
PAINLEVEL_OUTOF10: 2
PAINLEVEL_OUTOF10: 7
PAINLEVEL_OUTOF10: 2

## 2025-07-26 ASSESSMENT — PAIN DESCRIPTION - ORIENTATION
ORIENTATION: POSTERIOR
ORIENTATION: POSTERIOR
ORIENTATION: ANTERIOR;POSTERIOR
ORIENTATION: ANTERIOR;POSTERIOR
ORIENTATION: POSTERIOR
ORIENTATION: MID
ORIENTATION: ANTERIOR;POSTERIOR
ORIENTATION: MID
ORIENTATION: ANTERIOR;POSTERIOR
ORIENTATION: RIGHT;LEFT;ANTERIOR;POSTERIOR

## 2025-07-26 ASSESSMENT — PAIN DESCRIPTION - LOCATION
LOCATION: HEAD;NECK
LOCATION: NECK
LOCATION: HEAD;NECK
LOCATION: NECK
LOCATION: HEAD;NECK
LOCATION: HEAD;NECK
LOCATION: HEAD
LOCATION: NECK;HEAD

## 2025-07-26 ASSESSMENT — PAIN DESCRIPTION - PAIN TYPE
TYPE: ACUTE PAIN

## 2025-07-26 ASSESSMENT — PAIN DESCRIPTION - DESCRIPTORS
DESCRIPTORS: ACHING
DESCRIPTORS: ACHING
DESCRIPTORS: THROBBING
DESCRIPTORS: ACHING
DESCRIPTORS: THROBBING
DESCRIPTORS: ACHING

## 2025-07-26 ASSESSMENT — PAIN - FUNCTIONAL ASSESSMENT
PAIN_FUNCTIONAL_ASSESSMENT: ACTIVITIES ARE NOT PREVENTED
PAIN_FUNCTIONAL_ASSESSMENT: PREVENTS OR INTERFERES SOME ACTIVE ACTIVITIES AND ADLS
PAIN_FUNCTIONAL_ASSESSMENT: PREVENTS OR INTERFERES SOME ACTIVE ACTIVITIES AND ADLS
PAIN_FUNCTIONAL_ASSESSMENT: PREVENTS OR INTERFERES WITH MANY ACTIVE NOT PASSIVE ACTIVITIES
PAIN_FUNCTIONAL_ASSESSMENT: PREVENTS OR INTERFERES SOME ACTIVE ACTIVITIES AND ADLS
PAIN_FUNCTIONAL_ASSESSMENT: PREVENTS OR INTERFERES WITH ALL ACTIVE AND SOME PASSIVE ACTIVITIES
PAIN_FUNCTIONAL_ASSESSMENT: PREVENTS OR INTERFERES SOME ACTIVE ACTIVITIES AND ADLS
PAIN_FUNCTIONAL_ASSESSMENT: ACTIVITIES ARE NOT PREVENTED

## 2025-07-26 ASSESSMENT — PAIN DESCRIPTION - DIRECTION
RADIATING_TOWARDS: BACK
RADIATING_TOWARDS: BACK

## 2025-07-26 ASSESSMENT — PAIN DESCRIPTION - ONSET
ONSET: ON-GOING
ONSET: GRADUAL
ONSET: ON-GOING
ONSET: GRADUAL
ONSET: GRADUAL

## 2025-07-26 ASSESSMENT — PAIN DESCRIPTION - FREQUENCY
FREQUENCY: INTERMITTENT
FREQUENCY: CONTINUOUS
FREQUENCY: CONTINUOUS
FREQUENCY: INTERMITTENT
FREQUENCY: INTERMITTENT
FREQUENCY: CONTINUOUS

## 2025-07-27 ENCOUNTER — APPOINTMENT (OUTPATIENT)
Facility: HOSPITAL | Age: 75
DRG: 871 | End: 2025-07-27
Payer: OTHER GOVERNMENT

## 2025-07-27 PROBLEM — R41.82 ALTERED MENTAL STATUS: Status: ACTIVE | Noted: 2025-07-27

## 2025-07-27 LAB
ANION GAP SERPL CALC-SCNC: 7 MMOL/L (ref 2–12)
BUN SERPL-MCNC: 18 MG/DL (ref 6–20)
BUN/CREAT SERPL: 22 (ref 12–20)
CALCIUM SERPL-MCNC: 9.1 MG/DL (ref 8.5–10.1)
CHLORIDE SERPL-SCNC: 102 MMOL/L (ref 97–108)
CO2 SERPL-SCNC: 27 MMOL/L (ref 21–32)
CREAT SERPL-MCNC: 0.82 MG/DL (ref 0.7–1.3)
ERYTHROCYTE [DISTWIDTH] IN BLOOD BY AUTOMATED COUNT: 12.9 % (ref 11.5–14.5)
GLUCOSE BLD STRIP.AUTO-MCNC: 243 MG/DL (ref 65–117)
GLUCOSE BLD STRIP.AUTO-MCNC: 288 MG/DL (ref 65–117)
GLUCOSE BLD STRIP.AUTO-MCNC: 413 MG/DL (ref 65–117)
GLUCOSE BLD STRIP.AUTO-MCNC: 413 MG/DL (ref 65–117)
GLUCOSE SERPL-MCNC: 247 MG/DL (ref 65–100)
HCT VFR BLD AUTO: 34.8 % (ref 36.6–50.3)
HGB BLD-MCNC: 11.7 G/DL (ref 12.1–17)
MCH RBC QN AUTO: 28.7 PG (ref 26–34)
MCHC RBC AUTO-ENTMCNC: 33.6 G/DL (ref 30–36.5)
MCV RBC AUTO: 85.3 FL (ref 80–99)
NRBC # BLD: 0 K/UL (ref 0–0.01)
NRBC BLD-RTO: 0 PER 100 WBC
PLATELET # BLD AUTO: 248 K/UL (ref 150–400)
PMV BLD AUTO: 9.8 FL (ref 8.9–12.9)
POTASSIUM SERPL-SCNC: 4.5 MMOL/L (ref 3.5–5.1)
RBC # BLD AUTO: 4.08 M/UL (ref 4.1–5.7)
SERVICE CMNT-IMP: ABNORMAL
SODIUM SERPL-SCNC: 136 MMOL/L (ref 136–145)
WBC # BLD AUTO: 9.6 K/UL (ref 4.1–11.1)

## 2025-07-27 PROCEDURE — 6360000004 HC RX CONTRAST MEDICATION: Performed by: INTERNAL MEDICINE

## 2025-07-27 PROCEDURE — 36415 COLL VENOUS BLD VENIPUNCTURE: CPT

## 2025-07-27 PROCEDURE — 85027 COMPLETE CBC AUTOMATED: CPT

## 2025-07-27 PROCEDURE — 6370000000 HC RX 637 (ALT 250 FOR IP): Performed by: PHYSICIAN ASSISTANT

## 2025-07-27 PROCEDURE — 99221 1ST HOSP IP/OBS SF/LOW 40: CPT | Performed by: PSYCHIATRY & NEUROLOGY

## 2025-07-27 PROCEDURE — 82962 GLUCOSE BLOOD TEST: CPT

## 2025-07-27 PROCEDURE — 2060000000 HC ICU INTERMEDIATE R&B

## 2025-07-27 PROCEDURE — 6360000002 HC RX W HCPCS: Performed by: HOSPITALIST

## 2025-07-27 PROCEDURE — 80048 BASIC METABOLIC PNL TOTAL CA: CPT

## 2025-07-27 PROCEDURE — 6370000000 HC RX 637 (ALT 250 FOR IP): Performed by: INTERNAL MEDICINE

## 2025-07-27 PROCEDURE — 2500000003 HC RX 250 WO HCPCS: Performed by: INTERNAL MEDICINE

## 2025-07-27 PROCEDURE — 70498 CT ANGIOGRAPHY NECK: CPT

## 2025-07-27 PROCEDURE — 6360000002 HC RX W HCPCS: Performed by: INTERNAL MEDICINE

## 2025-07-27 PROCEDURE — 2500000003 HC RX 250 WO HCPCS: Performed by: HOSPITALIST

## 2025-07-27 PROCEDURE — 6370000000 HC RX 637 (ALT 250 FOR IP): Performed by: HOSPITALIST

## 2025-07-27 RX ORDER — BUTALBITAL, ACETAMINOPHEN AND CAFFEINE 50; 325; 40 MG/1; MG/1; MG/1
1 TABLET ORAL EVERY 4 HOURS PRN
Status: DISCONTINUED | OUTPATIENT
Start: 2025-07-27 | End: 2025-07-30 | Stop reason: HOSPADM

## 2025-07-27 RX ORDER — IOPAMIDOL 755 MG/ML
100 INJECTION, SOLUTION INTRAVASCULAR
Status: COMPLETED | OUTPATIENT
Start: 2025-07-27 | End: 2025-07-27

## 2025-07-27 RX ORDER — INSULIN GLARGINE 100 [IU]/ML
22 INJECTION, SOLUTION SUBCUTANEOUS DAILY
Status: DISCONTINUED | OUTPATIENT
Start: 2025-07-28 | End: 2025-07-27

## 2025-07-27 RX ORDER — INSULIN GLARGINE 100 [IU]/ML
25 INJECTION, SOLUTION SUBCUTANEOUS DAILY
Status: DISCONTINUED | OUTPATIENT
Start: 2025-07-28 | End: 2025-07-29

## 2025-07-27 RX ORDER — INSULIN LISPRO 100 [IU]/ML
0-8 INJECTION, SOLUTION INTRAVENOUS; SUBCUTANEOUS
Status: DISCONTINUED | OUTPATIENT
Start: 2025-07-27 | End: 2025-07-30 | Stop reason: HOSPADM

## 2025-07-27 RX ADMIN — INSULIN LISPRO 5 UNITS: 100 INJECTION, SOLUTION INTRAVENOUS; SUBCUTANEOUS at 09:23

## 2025-07-27 RX ADMIN — PREDNISOLONE ACETATE 1 DROP: 10 SUSPENSION/ DROPS OPHTHALMIC at 09:24

## 2025-07-27 RX ADMIN — INSULIN LISPRO 8 UNITS: 100 INJECTION, SOLUTION INTRAVENOUS; SUBCUTANEOUS at 16:34

## 2025-07-27 RX ADMIN — SODIUM CHLORIDE, PRESERVATIVE FREE 10 ML: 5 INJECTION INTRAVENOUS at 09:25

## 2025-07-27 RX ADMIN — OXYCODONE 5 MG: 5 TABLET ORAL at 23:48

## 2025-07-27 RX ADMIN — MECLIZINE 12.5 MG: 12.5 TABLET ORAL at 11:57

## 2025-07-27 RX ADMIN — ENOXAPARIN SODIUM 30 MG: 100 INJECTION SUBCUTANEOUS at 21:43

## 2025-07-27 RX ADMIN — AMLODIPINE BESYLATE 10 MG: 5 TABLET ORAL at 09:22

## 2025-07-27 RX ADMIN — OXYCODONE 5 MG: 5 TABLET ORAL at 19:59

## 2025-07-27 RX ADMIN — WATER 2000 MG: 1 INJECTION INTRAMUSCULAR; INTRAVENOUS; SUBCUTANEOUS at 23:47

## 2025-07-27 RX ADMIN — OXYCODONE 5 MG: 5 TABLET ORAL at 03:20

## 2025-07-27 RX ADMIN — DICLOFENAC SODIUM 2 G: 10 GEL TOPICAL at 21:44

## 2025-07-27 RX ADMIN — TIMOLOL MALEATE 1 DROP: 5 SOLUTION OPHTHALMIC at 09:24

## 2025-07-27 RX ADMIN — DULOXETINE 60 MG: 30 CAPSULE, DELAYED RELEASE ORAL at 21:43

## 2025-07-27 RX ADMIN — WATER 2000 MG: 1 INJECTION INTRAMUSCULAR; INTRAVENOUS; SUBCUTANEOUS at 15:29

## 2025-07-27 RX ADMIN — SODIUM CHLORIDE, PRESERVATIVE FREE 10 ML: 5 INJECTION INTRAVENOUS at 21:44

## 2025-07-27 RX ADMIN — IOPAMIDOL 100 ML: 755 INJECTION, SOLUTION INTRAVENOUS at 20:50

## 2025-07-27 RX ADMIN — PREDNISONE 40 MG: 20 TABLET ORAL at 09:23

## 2025-07-27 RX ADMIN — OXYCODONE 5 MG: 5 TABLET ORAL at 15:54

## 2025-07-27 RX ADMIN — DICLOFENAC SODIUM 2 G: 10 GEL TOPICAL at 09:20

## 2025-07-27 RX ADMIN — INSULIN LISPRO 1 UNITS: 100 INJECTION, SOLUTION INTRAVENOUS; SUBCUTANEOUS at 09:23

## 2025-07-27 RX ADMIN — MECLIZINE 12.5 MG: 12.5 TABLET ORAL at 16:31

## 2025-07-27 RX ADMIN — WATER 2000 MG: 1 INJECTION INTRAMUSCULAR; INTRAVENOUS; SUBCUTANEOUS at 07:55

## 2025-07-27 RX ADMIN — TIZANIDINE 4 MG: 4 TABLET ORAL at 09:20

## 2025-07-27 RX ADMIN — CARVEDILOL 25 MG: 12.5 TABLET, FILM COATED ORAL at 07:55

## 2025-07-27 RX ADMIN — INSULIN GLARGINE 18 UNITS: 100 INJECTION, SOLUTION SUBCUTANEOUS at 09:24

## 2025-07-27 RX ADMIN — ATORVASTATIN CALCIUM 80 MG: 40 TABLET, FILM COATED ORAL at 16:31

## 2025-07-27 RX ADMIN — ENOXAPARIN SODIUM 30 MG: 100 INJECTION SUBCUTANEOUS at 09:23

## 2025-07-27 RX ADMIN — INSULIN LISPRO 5 UNITS: 100 INJECTION, SOLUTION INTRAVENOUS; SUBCUTANEOUS at 11:57

## 2025-07-27 RX ADMIN — INSULIN LISPRO 8 UNITS: 100 INJECTION, SOLUTION INTRAVENOUS; SUBCUTANEOUS at 21:43

## 2025-07-27 RX ADMIN — POLYETHYLENE GLYCOL 3350 17 G: 17 POWDER, FOR SOLUTION ORAL at 09:21

## 2025-07-27 RX ADMIN — MECLIZINE 12.5 MG: 12.5 TABLET ORAL at 23:48

## 2025-07-27 RX ADMIN — CARVEDILOL 25 MG: 12.5 TABLET, FILM COATED ORAL at 15:54

## 2025-07-27 RX ADMIN — OXYCODONE 5 MG: 5 TABLET ORAL at 07:56

## 2025-07-27 RX ADMIN — INSULIN LISPRO 2 UNITS: 100 INJECTION, SOLUTION INTRAVENOUS; SUBCUTANEOUS at 11:57

## 2025-07-27 RX ADMIN — MECLIZINE 12.5 MG: 12.5 TABLET ORAL at 06:15

## 2025-07-27 ASSESSMENT — PAIN DESCRIPTION - FREQUENCY
FREQUENCY: CONTINUOUS
FREQUENCY: INTERMITTENT

## 2025-07-27 ASSESSMENT — PAIN DESCRIPTION - DESCRIPTORS
DESCRIPTORS: ACHING

## 2025-07-27 ASSESSMENT — PAIN - FUNCTIONAL ASSESSMENT
PAIN_FUNCTIONAL_ASSESSMENT: PREVENTS OR INTERFERES SOME ACTIVE ACTIVITIES AND ADLS
PAIN_FUNCTIONAL_ASSESSMENT: ACTIVITIES ARE NOT PREVENTED
PAIN_FUNCTIONAL_ASSESSMENT: PREVENTS OR INTERFERES SOME ACTIVE ACTIVITIES AND ADLS
PAIN_FUNCTIONAL_ASSESSMENT: ACTIVITIES ARE NOT PREVENTED
PAIN_FUNCTIONAL_ASSESSMENT: PREVENTS OR INTERFERES SOME ACTIVE ACTIVITIES AND ADLS

## 2025-07-27 ASSESSMENT — PAIN SCALES - GENERAL
PAINLEVEL_OUTOF10: 5
PAINLEVEL_OUTOF10: 8
PAINLEVEL_OUTOF10: 2
PAINLEVEL_OUTOF10: 4
PAINLEVEL_OUTOF10: 0
PAINLEVEL_OUTOF10: 9
PAINLEVEL_OUTOF10: 0
PAINLEVEL_OUTOF10: 9
PAINLEVEL_OUTOF10: 9
PAINLEVEL_OUTOF10: 8
PAINLEVEL_OUTOF10: 3
PAINLEVEL_OUTOF10: 8
PAINLEVEL_OUTOF10: 0
PAINLEVEL_OUTOF10: 4

## 2025-07-27 ASSESSMENT — PAIN DESCRIPTION - ORIENTATION
ORIENTATION: ANTERIOR;POSTERIOR
ORIENTATION: POSTERIOR
ORIENTATION: ANTERIOR;POSTERIOR
ORIENTATION: POSTERIOR
ORIENTATION: ANTERIOR;POSTERIOR
ORIENTATION: POSTERIOR
ORIENTATION: ANTERIOR;POSTERIOR
ORIENTATION: POSTERIOR

## 2025-07-27 ASSESSMENT — PAIN DESCRIPTION - LOCATION
LOCATION: HEAD;NECK

## 2025-07-27 ASSESSMENT — PAIN DESCRIPTION - DIRECTION
RADIATING_TOWARDS: BACK
RADIATING_TOWARDS: NECK

## 2025-07-27 ASSESSMENT — PAIN DESCRIPTION - ONSET
ONSET: ON-GOING
ONSET: GRADUAL
ONSET: ON-GOING
ONSET: ON-GOING
ONSET: GRADUAL

## 2025-07-27 ASSESSMENT — PAIN DESCRIPTION - PAIN TYPE
TYPE: ACUTE PAIN

## 2025-07-28 ENCOUNTER — APPOINTMENT (OUTPATIENT)
Facility: HOSPITAL | Age: 75
DRG: 871 | End: 2025-07-28
Payer: OTHER GOVERNMENT

## 2025-07-28 PROBLEM — E11.11 DIABETIC KETOACIDOSIS WITH COMA ASSOCIATED WITH TYPE 2 DIABETES MELLITUS (HCC): Status: ACTIVE | Noted: 2025-07-28

## 2025-07-28 PROBLEM — S91.102A OPEN WOUND OF GREAT TOE, LEFT, INITIAL ENCOUNTER: Status: ACTIVE | Noted: 2025-07-28

## 2025-07-28 PROBLEM — E11.65 POORLY CONTROLLED DIABETES MELLITUS (HCC): Status: ACTIVE | Noted: 2025-07-28

## 2025-07-28 PROBLEM — B95.61 STAPHYLOCOCCUS AUREUS BACTEREMIA: Status: ACTIVE | Noted: 2025-07-28

## 2025-07-28 PROBLEM — Z89.511 HX OF RIGHT BKA (HCC): Status: ACTIVE | Noted: 2025-07-28

## 2025-07-28 PROBLEM — E11.10 DIABETIC ACIDOSIS WITHOUT COMA (HCC): Status: ACTIVE | Noted: 2025-07-28

## 2025-07-28 PROBLEM — R73.09 HEMOGLOBIN A1C GREATER THAN 9%, INDICATING POOR DIABETIC CONTROL: Status: ACTIVE | Noted: 2025-07-28

## 2025-07-28 PROBLEM — N17.9 AKI (ACUTE KIDNEY INJURY): Status: ACTIVE | Noted: 2025-07-28

## 2025-07-28 PROBLEM — J96.01 ACUTE RESPIRATORY FAILURE WITH HYPOXIA (HCC): Status: ACTIVE | Noted: 2025-07-28

## 2025-07-28 PROBLEM — R78.81 STAPHYLOCOCCUS AUREUS BACTEREMIA: Status: ACTIVE | Noted: 2025-07-28

## 2025-07-28 PROBLEM — G93.41 METABOLIC ENCEPHALOPATHY: Status: ACTIVE | Noted: 2025-07-28

## 2025-07-28 LAB
ANION GAP SERPL CALC-SCNC: 5 MMOL/L (ref 2–12)
BACTERIA SPEC CULT: ABNORMAL
BACTERIA SPEC CULT: NORMAL
BUN SERPL-MCNC: 20 MG/DL (ref 6–20)
BUN/CREAT SERPL: 21 (ref 12–20)
CALCIUM SERPL-MCNC: 9.3 MG/DL (ref 8.5–10.1)
CHLORIDE SERPL-SCNC: 102 MMOL/L (ref 97–108)
CO2 SERPL-SCNC: 26 MMOL/L (ref 21–32)
CREAT SERPL-MCNC: 0.94 MG/DL (ref 0.7–1.3)
ERYTHROCYTE [DISTWIDTH] IN BLOOD BY AUTOMATED COUNT: 12.6 % (ref 11.5–14.5)
GLUCOSE BLD STRIP.AUTO-MCNC: 233 MG/DL (ref 65–117)
GLUCOSE BLD STRIP.AUTO-MCNC: 282 MG/DL (ref 65–117)
GLUCOSE BLD STRIP.AUTO-MCNC: 334 MG/DL (ref 65–117)
GLUCOSE BLD STRIP.AUTO-MCNC: 424 MG/DL (ref 65–117)
GLUCOSE SERPL-MCNC: 260 MG/DL (ref 65–100)
HCT VFR BLD AUTO: 40.2 % (ref 36.6–50.3)
HGB BLD-MCNC: 13.2 G/DL (ref 12.1–17)
MCH RBC QN AUTO: 28.9 PG (ref 26–34)
MCHC RBC AUTO-ENTMCNC: 32.8 G/DL (ref 30–36.5)
MCV RBC AUTO: 88 FL (ref 80–99)
NRBC # BLD: 0 K/UL (ref 0–0.01)
NRBC BLD-RTO: 0 PER 100 WBC
PLATELET # BLD AUTO: 256 K/UL (ref 150–400)
PMV BLD AUTO: 9.8 FL (ref 8.9–12.9)
POTASSIUM SERPL-SCNC: 4.6 MMOL/L (ref 3.5–5.1)
RBC # BLD AUTO: 4.57 M/UL (ref 4.1–5.7)
SERVICE CMNT-IMP: ABNORMAL
SERVICE CMNT-IMP: NORMAL
SODIUM SERPL-SCNC: 133 MMOL/L (ref 136–145)
WBC # BLD AUTO: 10.9 K/UL (ref 4.1–11.1)

## 2025-07-28 PROCEDURE — 6360000002 HC RX W HCPCS: Performed by: INTERNAL MEDICINE

## 2025-07-28 PROCEDURE — 6360000002 HC RX W HCPCS: Performed by: HOSPITALIST

## 2025-07-28 PROCEDURE — 82962 GLUCOSE BLOOD TEST: CPT

## 2025-07-28 PROCEDURE — 6370000000 HC RX 637 (ALT 250 FOR IP): Performed by: PHYSICIAN ASSISTANT

## 2025-07-28 PROCEDURE — 99222 1ST HOSP IP/OBS MODERATE 55: CPT

## 2025-07-28 PROCEDURE — 6370000000 HC RX 637 (ALT 250 FOR IP): Performed by: INTERNAL MEDICINE

## 2025-07-28 PROCEDURE — 2500000003 HC RX 250 WO HCPCS: Performed by: INTERNAL MEDICINE

## 2025-07-28 PROCEDURE — 99223 1ST HOSP IP/OBS HIGH 75: CPT | Performed by: INTERNAL MEDICINE

## 2025-07-28 PROCEDURE — 85027 COMPLETE CBC AUTOMATED: CPT

## 2025-07-28 PROCEDURE — 2500000003 HC RX 250 WO HCPCS: Performed by: HOSPITALIST

## 2025-07-28 PROCEDURE — 36415 COLL VENOUS BLD VENIPUNCTURE: CPT

## 2025-07-28 PROCEDURE — 97530 THERAPEUTIC ACTIVITIES: CPT

## 2025-07-28 PROCEDURE — 80048 BASIC METABOLIC PNL TOTAL CA: CPT

## 2025-07-28 PROCEDURE — 2060000000 HC ICU INTERMEDIATE R&B

## 2025-07-28 PROCEDURE — 73620 X-RAY EXAM OF FOOT: CPT

## 2025-07-28 PROCEDURE — 6370000000 HC RX 637 (ALT 250 FOR IP): Performed by: HOSPITALIST

## 2025-07-28 PROCEDURE — 72141 MRI NECK SPINE W/O DYE: CPT

## 2025-07-28 PROCEDURE — 6370000000 HC RX 637 (ALT 250 FOR IP)

## 2025-07-28 RX ORDER — INSULIN LISPRO 100 [IU]/ML
3 INJECTION, SOLUTION INTRAVENOUS; SUBCUTANEOUS
Status: DISCONTINUED | OUTPATIENT
Start: 2025-07-28 | End: 2025-07-28

## 2025-07-28 RX ORDER — INSULIN LISPRO 100 [IU]/ML
5 INJECTION, SOLUTION INTRAVENOUS; SUBCUTANEOUS
Status: DISCONTINUED | OUTPATIENT
Start: 2025-07-28 | End: 2025-07-29

## 2025-07-28 RX ORDER — IBUPROFEN 600 MG/1
600 TABLET, FILM COATED ORAL
Status: DISPENSED | OUTPATIENT
Start: 2025-07-28 | End: 2025-07-30

## 2025-07-28 RX ADMIN — INSULIN LISPRO 5 UNITS: 100 INJECTION, SOLUTION INTRAVENOUS; SUBCUTANEOUS at 17:37

## 2025-07-28 RX ADMIN — OXYCODONE 5 MG: 5 TABLET ORAL at 09:55

## 2025-07-28 RX ADMIN — ATORVASTATIN CALCIUM 80 MG: 40 TABLET, FILM COATED ORAL at 21:41

## 2025-07-28 RX ADMIN — SODIUM CHLORIDE, PRESERVATIVE FREE 10 ML: 5 INJECTION INTRAVENOUS at 10:00

## 2025-07-28 RX ADMIN — IBUPROFEN 600 MG: 600 TABLET ORAL at 09:58

## 2025-07-28 RX ADMIN — INSULIN LISPRO 6 UNITS: 100 INJECTION, SOLUTION INTRAVENOUS; SUBCUTANEOUS at 12:00

## 2025-07-28 RX ADMIN — WATER 2000 MG: 1 INJECTION INTRAMUSCULAR; INTRAVENOUS; SUBCUTANEOUS at 15:54

## 2025-07-28 RX ADMIN — MECLIZINE 12.5 MG: 12.5 TABLET ORAL at 23:56

## 2025-07-28 RX ADMIN — IBUPROFEN 600 MG: 600 TABLET ORAL at 17:36

## 2025-07-28 RX ADMIN — PREDNISONE 40 MG: 20 TABLET ORAL at 09:57

## 2025-07-28 RX ADMIN — CARVEDILOL 25 MG: 12.5 TABLET, FILM COATED ORAL at 17:38

## 2025-07-28 RX ADMIN — MECLIZINE 12.5 MG: 12.5 TABLET ORAL at 17:36

## 2025-07-28 RX ADMIN — INSULIN GLARGINE 25 UNITS: 100 INJECTION, SOLUTION SUBCUTANEOUS at 09:56

## 2025-07-28 RX ADMIN — PREDNISOLONE ACETATE 1 DROP: 10 SUSPENSION/ DROPS OPHTHALMIC at 09:59

## 2025-07-28 RX ADMIN — AMLODIPINE BESYLATE 10 MG: 5 TABLET ORAL at 09:57

## 2025-07-28 RX ADMIN — INSULIN LISPRO 8 UNITS: 100 INJECTION, SOLUTION INTRAVENOUS; SUBCUTANEOUS at 20:50

## 2025-07-28 RX ADMIN — WATER 2000 MG: 1 INJECTION INTRAMUSCULAR; INTRAVENOUS; SUBCUTANEOUS at 23:56

## 2025-07-28 RX ADMIN — INSULIN LISPRO 2 UNITS: 100 INJECTION, SOLUTION INTRAVENOUS; SUBCUTANEOUS at 09:58

## 2025-07-28 RX ADMIN — WATER 2000 MG: 1 INJECTION INTRAMUSCULAR; INTRAVENOUS; SUBCUTANEOUS at 09:57

## 2025-07-28 RX ADMIN — DULOXETINE 60 MG: 30 CAPSULE, DELAYED RELEASE ORAL at 20:51

## 2025-07-28 RX ADMIN — BUTALBITAL, ACETAMINOPHEN, AND CAFFEINE 1 TABLET: 325; 50; 40 TABLET ORAL at 17:36

## 2025-07-28 RX ADMIN — INSULIN LISPRO 4 UNITS: 100 INJECTION, SOLUTION INTRAVENOUS; SUBCUTANEOUS at 17:36

## 2025-07-28 RX ADMIN — CARVEDILOL 25 MG: 12.5 TABLET, FILM COATED ORAL at 09:57

## 2025-07-28 RX ADMIN — MECLIZINE 12.5 MG: 12.5 TABLET ORAL at 05:18

## 2025-07-28 RX ADMIN — TIMOLOL MALEATE 1 DROP: 5 SOLUTION OPHTHALMIC at 09:59

## 2025-07-28 RX ADMIN — ACETAMINOPHEN 650 MG: 325 TABLET ORAL at 20:50

## 2025-07-28 RX ADMIN — BUTALBITAL, ACETAMINOPHEN, AND CAFFEINE 1 TABLET: 325; 50; 40 TABLET ORAL at 12:13

## 2025-07-28 RX ADMIN — ENOXAPARIN SODIUM 30 MG: 100 INJECTION SUBCUTANEOUS at 09:56

## 2025-07-28 RX ADMIN — MECLIZINE 12.5 MG: 12.5 TABLET ORAL at 12:00

## 2025-07-28 RX ADMIN — SODIUM CHLORIDE, PRESERVATIVE FREE 10 ML: 5 INJECTION INTRAVENOUS at 20:52

## 2025-07-28 RX ADMIN — OXYCODONE 5 MG: 5 TABLET ORAL at 05:18

## 2025-07-28 RX ADMIN — OXYCODONE 5 MG: 5 TABLET ORAL at 20:50

## 2025-07-28 RX ADMIN — ENOXAPARIN SODIUM 30 MG: 100 INJECTION SUBCUTANEOUS at 20:49

## 2025-07-28 ASSESSMENT — PAIN DESCRIPTION - LOCATION
LOCATION: HEAD
LOCATION: HEAD;NECK
LOCATION: HEAD
LOCATION: HEAD;NECK
LOCATION: HEAD
LOCATION: HEAD;NECK
LOCATION: HEAD

## 2025-07-28 ASSESSMENT — PAIN DESCRIPTION - ONSET
ONSET: ON-GOING
ONSET: ON-GOING

## 2025-07-28 ASSESSMENT — PAIN SCALES - GENERAL
PAINLEVEL_OUTOF10: 9
PAINLEVEL_OUTOF10: 0
PAINLEVEL_OUTOF10: 4
PAINLEVEL_OUTOF10: 0
PAINLEVEL_OUTOF10: 8
PAINLEVEL_OUTOF10: 7
PAINLEVEL_OUTOF10: 5
PAINLEVEL_OUTOF10: 0
PAINLEVEL_OUTOF10: 9

## 2025-07-28 ASSESSMENT — PAIN DESCRIPTION - FREQUENCY
FREQUENCY: CONTINUOUS
FREQUENCY: CONTINUOUS

## 2025-07-28 ASSESSMENT — PAIN DESCRIPTION - PAIN TYPE
TYPE: ACUTE PAIN
TYPE: ACUTE PAIN

## 2025-07-28 ASSESSMENT — PAIN - FUNCTIONAL ASSESSMENT
PAIN_FUNCTIONAL_ASSESSMENT: PREVENTS OR INTERFERES SOME ACTIVE ACTIVITIES AND ADLS
PAIN_FUNCTIONAL_ASSESSMENT: PREVENTS OR INTERFERES SOME ACTIVE ACTIVITIES AND ADLS

## 2025-07-28 ASSESSMENT — PAIN DESCRIPTION - ORIENTATION
ORIENTATION: POSTERIOR
ORIENTATION: POSTERIOR
ORIENTATION: MID
ORIENTATION: POSTERIOR

## 2025-07-28 ASSESSMENT — PAIN DESCRIPTION - DESCRIPTORS
DESCRIPTORS: ACHING

## 2025-07-28 ASSESSMENT — PAIN SCALES - WONG BAKER: WONGBAKER_NUMERICALRESPONSE: NO HURT

## 2025-07-28 NOTE — CARE COORDINATION
Transition of Care Plan:     RUR: 12    Prior Level of Functioning: independent; niece came over 3x a wk to check in    Disposition: SNF vs  possible transfer to the [facility]    MIKE: 7/29/25    2:11 PM   CM completed chart review.   CM met with Pt and and got the [facility] form and I am faxing it to workflow this afternoon with Clinical Updates.   Offered SNF rehab and he was not interested.. but he stated he would work with therapy this afternoon and we can discuss again at a later time.   Offered HH and he was not interested.     Now that Pt is going to need IV ABX.. Pt will need to decide between SNF and HH    Pt is going to need IV Abx per ID MD dated 7/28/25:   Antimicrobial orders for discharge  - Cefazolin 2 g   IV every 8 hours end date 8/6  -May administer as continuous infusion  -Pull line at end of therapy.    -Weekly CBC, CMP-  -Fax reports to [phone], call with critical labs  at [phone]  -Encourage adequate fluids, daily probiotic/yogurt  -If line malfunction occurs and home health cannot reposition  please send patient to ED immediately  -ID follow-up -no follow-up required  - If persistent side effects occur stop antibiotic and call-ID/PCP    CM will need to continue to work with him on safe discharge plan.     If SNF or IPR: Date FOC offered: 7/25/25  Date FOC received:   Accepting facility:   Date authorization started with reference number:   Date authorization received and expires:     Follow up appointments:[facility]  DME needed: Pt has a w/c, rollator, crutches, RW.  Transportation at discharge: BLS  IM/IMM Medicare/ letter given: n/a  Is patient a  and connected with [facility]?               If yes, was [place] transfer form completed and VA notified? Yes  Caregiver Contact: Rosa Ryan (Child)  [phone] (Mobile)   Discharge Caregiver contacted prior to discharge? Pt preference to update family   Care Conference needed? N/a  Barriers to discharge: Neurology, ID clearance.  BS Control  ? Safe

## 2025-07-28 NOTE — DIABETES MGMT
[facility]  DIABETES MANAGEMENT CONSULT    Consulted by Provider for advanced nursing evaluation and care for inpatient blood glucose management.    Evaluation and Action Plan   Alexandro Ortiz is a 75 y.o. male with Type 2 diabetes per [facility] note, who presents to ER on 7/22/2025 with cc of altered mental status.  Patient was last heard from on Saturday by her niece.  They did a welfare check on the patient today and found him on the floor by his wheelchair, covered in feces.  Patient only able to say yes, no and help me. Patient was agitated/combative on arrival, blood glucose more than 700 and bicarb of 15. CT head unremarkable. [facility] 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 Patieint is followed by Infectious Disease for staph bacteremia. Notable labs on admission include Cl 113, CO2 19, AG 19, Cr 1.84, GFR 38, Mg 2.5, Lactic acid 2.3, Glu 468, Ricardo 4.9    Patient with Type 2 diabetes. HbA1C 9.2% above recommended target. Insulin gtt initiated per protocol. Patient transitioned to subq basal/bolus after ~12 hours. Patient is on the following home regimen per chart review Novolog 14-16 units with meals, Lantus 36-44  units daily, Metformin 500 mg ER BID. Metabolic encephalopathy has resolved and patient is alert Ox4 per therapy note on 7/27. Patient endorses a home regimen of Lantus 44 units, Novolog 14 units with meals and Metformin 500 mg ER BID. Patient on cefazolin IV for infection possibly related to discoloration of hallux of left foot.     on Lantus 18 units increased to 25  units today per Hospitalist. Noted steroid induced hyperglycemia preprandial lunch  on prednisone 40 mg daily last dose 7/28. Humalog 5 units with meals ordered. Steroid taper to start tomorrow

## 2025-07-29 ENCOUNTER — APPOINTMENT (OUTPATIENT)
Facility: HOSPITAL | Age: 75
DRG: 871 | End: 2025-07-29
Payer: OTHER GOVERNMENT

## 2025-07-29 LAB
ANION GAP SERPL CALC-SCNC: 7 MMOL/L (ref 2–12)
BUN SERPL-MCNC: 25 MG/DL (ref 6–20)
BUN/CREAT SERPL: 29 (ref 12–20)
CALCIUM SERPL-MCNC: 9.3 MG/DL (ref 8.5–10.1)
CHLORIDE SERPL-SCNC: 101 MMOL/L (ref 97–108)
CO2 SERPL-SCNC: 28 MMOL/L (ref 21–32)
CREAT SERPL-MCNC: 0.87 MG/DL (ref 0.7–1.3)
ERYTHROCYTE [DISTWIDTH] IN BLOOD BY AUTOMATED COUNT: 12.9 % (ref 11.5–14.5)
GLUCOSE BLD STRIP.AUTO-MCNC: 276 MG/DL (ref 65–117)
GLUCOSE BLD STRIP.AUTO-MCNC: 347 MG/DL (ref 65–117)
GLUCOSE BLD STRIP.AUTO-MCNC: 443 MG/DL (ref 65–117)
GLUCOSE BLD STRIP.AUTO-MCNC: 456 MG/DL (ref 65–117)
GLUCOSE SERPL-MCNC: 275 MG/DL (ref 65–100)
HCT VFR BLD AUTO: 37.8 % (ref 36.6–50.3)
HGB BLD-MCNC: 12.9 G/DL (ref 12.1–17)
MCH RBC QN AUTO: 29.1 PG (ref 26–34)
MCHC RBC AUTO-ENTMCNC: 34.1 G/DL (ref 30–36.5)
MCV RBC AUTO: 85.3 FL (ref 80–99)
NRBC # BLD: 0 K/UL (ref 0–0.01)
NRBC BLD-RTO: 0 PER 100 WBC
PLATELET # BLD AUTO: 282 K/UL (ref 150–400)
PMV BLD AUTO: 10 FL (ref 8.9–12.9)
POTASSIUM SERPL-SCNC: 4.3 MMOL/L (ref 3.5–5.1)
RBC # BLD AUTO: 4.43 M/UL (ref 4.1–5.7)
SERVICE CMNT-IMP: ABNORMAL
SODIUM SERPL-SCNC: 136 MMOL/L (ref 136–145)
WBC # BLD AUTO: 10.6 K/UL (ref 4.1–11.1)

## 2025-07-29 PROCEDURE — 82962 GLUCOSE BLOOD TEST: CPT

## 2025-07-29 PROCEDURE — 36415 COLL VENOUS BLD VENIPUNCTURE: CPT

## 2025-07-29 PROCEDURE — 97530 THERAPEUTIC ACTIVITIES: CPT

## 2025-07-29 PROCEDURE — 80048 BASIC METABOLIC PNL TOTAL CA: CPT

## 2025-07-29 PROCEDURE — 2500000003 HC RX 250 WO HCPCS: Performed by: INTERNAL MEDICINE

## 2025-07-29 PROCEDURE — 6370000000 HC RX 637 (ALT 250 FOR IP): Performed by: PHYSICIAN ASSISTANT

## 2025-07-29 PROCEDURE — 73718 MRI LOWER EXTREMITY W/O DYE: CPT

## 2025-07-29 PROCEDURE — 6370000000 HC RX 637 (ALT 250 FOR IP): Performed by: HOSPITALIST

## 2025-07-29 PROCEDURE — 97535 SELF CARE MNGMENT TRAINING: CPT

## 2025-07-29 PROCEDURE — 97110 THERAPEUTIC EXERCISES: CPT

## 2025-07-29 PROCEDURE — 99233 SBSQ HOSP IP/OBS HIGH 50: CPT | Performed by: INTERNAL MEDICINE

## 2025-07-29 PROCEDURE — 85027 COMPLETE CBC AUTOMATED: CPT

## 2025-07-29 PROCEDURE — 36410 VNPNXR 3YR/> PHY/QHP DX/THER: CPT

## 2025-07-29 PROCEDURE — 2500000003 HC RX 250 WO HCPCS: Performed by: HOSPITALIST

## 2025-07-29 PROCEDURE — 6360000002 HC RX W HCPCS: Performed by: HOSPITALIST

## 2025-07-29 PROCEDURE — 2060000000 HC ICU INTERMEDIATE R&B

## 2025-07-29 PROCEDURE — 6370000000 HC RX 637 (ALT 250 FOR IP)

## 2025-07-29 PROCEDURE — 05HB33Z INSERTION OF INFUSION DEVICE INTO RIGHT BASILIC VEIN, PERCUTANEOUS APPROACH: ICD-10-PCS | Performed by: STUDENT IN AN ORGANIZED HEALTH CARE EDUCATION/TRAINING PROGRAM

## 2025-07-29 PROCEDURE — 99232 SBSQ HOSP IP/OBS MODERATE 35: CPT

## 2025-07-29 PROCEDURE — 6360000002 HC RX W HCPCS: Performed by: INTERNAL MEDICINE

## 2025-07-29 PROCEDURE — 6370000000 HC RX 637 (ALT 250 FOR IP): Performed by: INTERNAL MEDICINE

## 2025-07-29 RX ORDER — SODIUM CHLORIDE 9 MG/ML
INJECTION, SOLUTION INTRAVENOUS PRN
Status: DISCONTINUED | OUTPATIENT
Start: 2025-07-29 | End: 2025-07-30 | Stop reason: HOSPADM

## 2025-07-29 RX ORDER — INSULIN LISPRO 100 [IU]/ML
10 INJECTION, SOLUTION INTRAVENOUS; SUBCUTANEOUS
Status: DISCONTINUED | OUTPATIENT
Start: 2025-07-29 | End: 2025-07-30 | Stop reason: HOSPADM

## 2025-07-29 RX ORDER — INSULIN GLARGINE 100 [IU]/ML
32 INJECTION, SOLUTION SUBCUTANEOUS DAILY
Status: DISCONTINUED | OUTPATIENT
Start: 2025-07-30 | End: 2025-07-30 | Stop reason: HOSPADM

## 2025-07-29 RX ORDER — SODIUM CHLORIDE 0.9 % (FLUSH) 0.9 %
5-40 SYRINGE (ML) INJECTION PRN
Status: DISCONTINUED | OUTPATIENT
Start: 2025-07-29 | End: 2025-07-30 | Stop reason: HOSPADM

## 2025-07-29 RX ORDER — LIDOCAINE HYDROCHLORIDE 20 MG/ML
100 INJECTION, SOLUTION INFILTRATION; PERINEURAL ONCE
Status: DISCONTINUED | OUTPATIENT
Start: 2025-07-29 | End: 2025-07-30 | Stop reason: HOSPADM

## 2025-07-29 RX ORDER — SODIUM CHLORIDE 0.9 % (FLUSH) 0.9 %
5-40 SYRINGE (ML) INJECTION EVERY 12 HOURS SCHEDULED
Status: DISCONTINUED | OUTPATIENT
Start: 2025-07-29 | End: 2025-07-30 | Stop reason: HOSPADM

## 2025-07-29 RX ADMIN — IBUPROFEN 600 MG: 600 TABLET ORAL at 11:36

## 2025-07-29 RX ADMIN — MECLIZINE 12.5 MG: 12.5 TABLET ORAL at 05:00

## 2025-07-29 RX ADMIN — PREDNISOLONE ACETATE 1 DROP: 10 SUSPENSION/ DROPS OPHTHALMIC at 08:43

## 2025-07-29 RX ADMIN — DULOXETINE 60 MG: 30 CAPSULE, DELAYED RELEASE ORAL at 20:31

## 2025-07-29 RX ADMIN — INSULIN LISPRO 4 UNITS: 100 INJECTION, SOLUTION INTRAVENOUS; SUBCUTANEOUS at 08:36

## 2025-07-29 RX ADMIN — WATER 2000 MG: 1 INJECTION INTRAMUSCULAR; INTRAVENOUS; SUBCUTANEOUS at 08:36

## 2025-07-29 RX ADMIN — IBUPROFEN 600 MG: 600 TABLET ORAL at 08:34

## 2025-07-29 RX ADMIN — INSULIN LISPRO 5 UNITS: 100 INJECTION, SOLUTION INTRAVENOUS; SUBCUTANEOUS at 11:37

## 2025-07-29 RX ADMIN — SODIUM CHLORIDE, PRESERVATIVE FREE 10 ML: 5 INJECTION INTRAVENOUS at 20:32

## 2025-07-29 RX ADMIN — DICLOFENAC SODIUM 2 G: 10 GEL TOPICAL at 08:45

## 2025-07-29 RX ADMIN — INSULIN LISPRO 5 UNITS: 100 INJECTION, SOLUTION INTRAVENOUS; SUBCUTANEOUS at 08:42

## 2025-07-29 RX ADMIN — ENOXAPARIN SODIUM 30 MG: 100 INJECTION SUBCUTANEOUS at 08:36

## 2025-07-29 RX ADMIN — INSULIN LISPRO 8 UNITS: 100 INJECTION, SOLUTION INTRAVENOUS; SUBCUTANEOUS at 20:32

## 2025-07-29 RX ADMIN — CARVEDILOL 25 MG: 12.5 TABLET, FILM COATED ORAL at 08:35

## 2025-07-29 RX ADMIN — MECLIZINE 12.5 MG: 12.5 TABLET ORAL at 11:36

## 2025-07-29 RX ADMIN — DICLOFENAC SODIUM 2 G: 10 GEL TOPICAL at 20:39

## 2025-07-29 RX ADMIN — MECLIZINE 12.5 MG: 12.5 TABLET ORAL at 16:54

## 2025-07-29 RX ADMIN — ATORVASTATIN CALCIUM 80 MG: 40 TABLET, FILM COATED ORAL at 16:54

## 2025-07-29 RX ADMIN — TIMOLOL MALEATE 1 DROP: 5 SOLUTION OPHTHALMIC at 08:43

## 2025-07-29 RX ADMIN — IBUPROFEN 600 MG: 600 TABLET ORAL at 15:26

## 2025-07-29 RX ADMIN — AMLODIPINE BESYLATE 10 MG: 5 TABLET ORAL at 08:34

## 2025-07-29 RX ADMIN — CARVEDILOL 25 MG: 12.5 TABLET, FILM COATED ORAL at 15:26

## 2025-07-29 RX ADMIN — OXYCODONE 2.5 MG: 5 TABLET ORAL at 08:35

## 2025-07-29 RX ADMIN — ENOXAPARIN SODIUM 30 MG: 100 INJECTION SUBCUTANEOUS at 20:31

## 2025-07-29 RX ADMIN — INSULIN GLARGINE 25 UNITS: 100 INJECTION, SOLUTION SUBCUTANEOUS at 08:36

## 2025-07-29 RX ADMIN — INSULIN LISPRO 10 UNITS: 100 INJECTION, SOLUTION INTRAVENOUS; SUBCUTANEOUS at 16:55

## 2025-07-29 RX ADMIN — OXYCODONE 5 MG: 5 TABLET ORAL at 20:37

## 2025-07-29 RX ADMIN — PREDNISONE 40 MG: 20 TABLET ORAL at 08:34

## 2025-07-29 RX ADMIN — INSULIN LISPRO 6 UNITS: 100 INJECTION, SOLUTION INTRAVENOUS; SUBCUTANEOUS at 11:36

## 2025-07-29 RX ADMIN — SODIUM CHLORIDE, PRESERVATIVE FREE 10 ML: 5 INJECTION INTRAVENOUS at 08:37

## 2025-07-29 RX ADMIN — INSULIN LISPRO 8 UNITS: 100 INJECTION, SOLUTION INTRAVENOUS; SUBCUTANEOUS at 16:54

## 2025-07-29 RX ADMIN — OXYCODONE 5 MG: 5 TABLET ORAL at 15:26

## 2025-07-29 RX ADMIN — WATER 2000 MG: 1 INJECTION INTRAMUSCULAR; INTRAVENOUS; SUBCUTANEOUS at 15:05

## 2025-07-29 ASSESSMENT — PAIN DESCRIPTION - ORIENTATION
ORIENTATION: ANTERIOR
ORIENTATION: ANTERIOR

## 2025-07-29 ASSESSMENT — PAIN DESCRIPTION - DESCRIPTORS
DESCRIPTORS: ACHING;SHOOTING
DESCRIPTORS: ACHING;SHOOTING
DESCRIPTORS: ACHING

## 2025-07-29 ASSESSMENT — PAIN DESCRIPTION - LOCATION
LOCATION: HEAD
LOCATION: HEAD;NECK
LOCATION: HEAD;NECK
LOCATION: NECK

## 2025-07-29 ASSESSMENT — PAIN - FUNCTIONAL ASSESSMENT: PAIN_FUNCTIONAL_ASSESSMENT: PREVENTS OR INTERFERES SOME ACTIVE ACTIVITIES AND ADLS

## 2025-07-29 ASSESSMENT — PAIN DESCRIPTION - ONSET: ONSET: ON-GOING

## 2025-07-29 ASSESSMENT — PAIN SCALES - GENERAL
PAINLEVEL_OUTOF10: 8
PAINLEVEL_OUTOF10: 4
PAINLEVEL_OUTOF10: 8
PAINLEVEL_OUTOF10: 6

## 2025-07-29 ASSESSMENT — PAIN DESCRIPTION - FREQUENCY: FREQUENCY: CONTINUOUS

## 2025-07-29 ASSESSMENT — PAIN DESCRIPTION - PAIN TYPE: TYPE: ACUTE PAIN;CHRONIC PAIN

## 2025-07-29 NOTE — DIABETES MGMT
[facility]  DIABETES MANAGEMENT CONSULT    Consulted by Provider for advanced nursing evaluation and care for inpatient blood glucose management.    Evaluation and Action Plan   Alexandro Ortiz is a 75 y.o. male with Type 2 diabetes per [facility] note, who presents to ER on 7/22/2025 with cc of altered mental status.  Patient was last heard from on Saturday by her niece.  They did a welfare check on the patient today and found him on the floor by his wheelchair, covered in feces.  Patient only able to say yes, no and help me. Patient was agitated/combative on arrival, blood glucose more than 700 and bicarb of 15. CT head unremarkable. [facility] 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 Patieint is followed by Infectious Disease for staph bacteremia. Notable labs on admission include Cl 113, CO2 19, AG 19, Cr 1.84, GFR 38, Mg 2.5, Lactic acid 2.3, Glu 468, Ricardo 4.9    Patient with Type 2 diabetes. HbA1C 9.2% above recommended target. Insulin gtt initiated per protocol. Patient transitioned to subq basal/bolus after ~12 hours. Patient is on the following home regimen per chart review Novolog 14-16 units with meals, Lantus 36-44  units daily, Metformin 500 mg ER BID. Metabolic encephalopathy has resolved and patient is alert Ox4 per therapy note on 7/27. Patient endorses a home regimen of Lantus 44 units, Novolog 18 units with meals and Metformin 500 mg ER BID. Patient on cefazolin IV for infection possibly related to discoloration of hallux of left foot. \" I eat anything I can eat. When you live by yourself I can't cook or get around ocassionally my niece buys groceries\"    Patient reports he takes 40 pills daily and became frustrated with taking so much medicine that he quit taking all of his pills

## 2025-07-29 NOTE — PROCEDURES
Midline Insertion Procedure Note    Procedure: Insertion of #4 FR/18G Midline    Indications:  Long Term IV therapy, Pt./Dr. Preference    Procedure Details    Risks of hemorrhage, and adverse drug reaction were discussed.  Vessel assessment revealed compressible well defined vessels.  Multiple sticks noted to BUE prior to PICC RN arrival. UE Midline placed without complication for patient.  2ml of Lidocaine 1% administered via infiltration prior to Midline insertion.  Time-Out performed at bedside with CATERINA guerra.      #4 FR/18G Midline inserted to the R Basilic vein per hospital protocol.   Blood return:  yes    Catheter length 15 cm, with 0 cm exposed. Mid upper arm circumference is 30 cm.   Catheter was flushed with 20 cc NS. Patient did tolerate procedure well. Upon completion of procedure, all MIDLINE kit components accounted for and intact.  Post Procedure hand-off given to CATERINA guerra.      Midline Brochure given to patient with teaching instruction. Bed returned to locked position with call bell in reach. PROCEDURE NOTE  Date: 7/29/2025   Name: Alexandro Ortiz  YOB: 1950    Procedures

## 2025-07-29 NOTE — CONSULTS
74 yo with history of non-instrumented cervical fusion C4/5 with neck pain.  MRI shows degenerative changes.  There is evidence of stenosis at the level below his fusion.  Currently he is not a surgical candidate given his high hemoglobin A1c and his positive blood cultures.  He is welcome to follow up with me as an outpatient.  
Infectious Disease Consult    Date of Consultation:  July 28, 2025  Reason for Consultation:Staph bacteremia  Referring Physician: Dr Villela  Date of Admission:7/22/2025      Impression    MSSA bacteremia  Blood culture 7/22+ for MSSA 1/ 4 RAC  Negative repeat cultures 7/23  Negative echo.  Possible etiology -multiple skin tears, R/hallux eschar.      Eschar of left hallux  Currently not discharging  X-ray of hallux  ?  Source of bacteremia    Poorly controlled diabetes  DKA  Treated    Metabolic encephalopathy  Resolved    Acute hypoxic respiratory failure  Resolved    CATERINA  Resolved    H/o right BKA 12/2024  Started with infected hallux per daughter    Plan    X-ray of L/ hallux  If bony involvement for MRI, podiatry evaluation  If no involvement continue cefazolin IV x 2 weeks  7/23-8/6  Adequate fluids,  Daily probiotic  ID service to follow.    Antimicrobial orders for discharge  - Cefazolin 2 g   IV every 8 hours end date 8/6  -May administer as continuous infusion  -Pull line at end of therapy.    -Weekly CBC, CMP-  -Fax reports to [phone], call with critical labs  at [phone]  -Encourage adequate fluids, daily probiotic/yogurt  -If line malfunction occurs and home health cannot reposition  please send patient to ED immediately  -ID follow-up -no follow-up required  - If persistent side effects occur stop antibiotic and call-ID/PCP    Possible adverse effects of long term antibiotics are inclusive of but not limited to following  BM suppression, neutropenia , cytopenias , aplastic anemia hemorrhage liver & renal dysfunction/ liver , renal failure  , GI dysfunction- N, V  Diarrhea,C.difficile disease, rash , allergy , anaphylaxis.toxic epidermal necrolysis  Neuro toxicity , seizure disorder  Side effects tend to be more pronounced in the elderly        Abx  Zosyn-7/22-7/25  Vancomycin-7/23, 7/24  Cefazolin 7/25-    Extensive review of chart notes, labs, imaging, cultures done  Additionally review of done: 
(2000 UT) CAPS capsule Take 1 capsule by mouth daily    Estela Olsen MD   vitamin B-12 (CYANOCOBALAMIN) 500 MCG tablet Take 2 tablets by mouth daily    Estela Olsen MD   DULoxetine (CYMBALTA) 60 MG extended release capsule Take 1 capsule by mouth at bedtime    Estela Olsen MD   lisinopril (PRINIVIL;ZESTRIL) 40 MG tablet Take 1 tablet by mouth daily    Estela Olsen MD   metFORMIN (GLUCOPHAGE) 500 MG tablet Take 2 tablets by mouth 2 times daily (with meals)    Estela Olsen MD   methocarbamol (ROBAXIN) 750 MG tablet Take 1 tablet by mouth every 6 hours as needed (muscle pain)    Estela Olsen MD   ofloxacin (OCUFLOX) 0.3 % solution Place 1 drop into the left eye 4 times daily    Estela Olsen MD   prednisoLONE acetate (PRED FORTE) 1 % ophthalmic suspension Place 1 drop into the left eye daily    Estela Olsen MD   timolol (BETIMOL) 0.5 % ophthalmic solution Place 1 drop into the left eye daily    Estela Olsen MD         CURRENT MEDICATIONS:  Note that completed medications (per the MAR) continue to display for 24 hours. Ordered medications to be given in the future also display.      I've reviewed the Past Medical History, Past Surgical History, Allergies, Medications, Family History (relevant to this case) and Social History and have updated these data in the electronic medical record.  ROS:  All system were reviewed and were negative except for the pertinent positives listed in the HPI.    PHYSICAL EXAMINATION:  Most Recent Vital Signs: Reviewed    Constitutional: Appearance normally developed  Head and face: normocephalic   Eyes:  no dysconjugate gaze  Respiratory: normal effort  Skin: no lesions noted  Carotids: No bruits.  Extremities: Negative for cyanosis,     Neurologic Examination:   Mental status: Alert, attentive, and oriented x2. Speech fluent with intact naming, CN: PERRL. EOMI. No nystagmus. Partial and limited fundoscopic exam

## 2025-07-29 NOTE — CARE COORDINATION
Transition of Care Plan:     RUR: 12     Prior Level of Functioning: independent; niece came over 3x a wk to check in     Disposition: SNF- [facility]    Insurance is [employer] and Medicare.   Medicare Number is [id number] (sent email to Pt. Access to verify and add to facesheet.)  Room: 100  RN to call report to [phone]  MIKE: [facility] at 10:30 AM for 11 AM  RIDE.   ([facility] WILL NOT WAIT LONGER THAN 30 MINUTES FOR PT.  PLEASE HAVE PT READY)    4 PM   CM met with Pt and he had a lot of concerns about being able to go to the bathroom utilizing the WC.. CM talked with Team and with [facility].. and if we can get him here in the morning on 25 they should be able to assess him tomorrow to see if that is possible.   Pt was agreeable to that plan.     CM was able to update Rosa SEGURA on plan.     Midline will be placed this afternoon.    CM setting up [facility] for [facility] at 11 AM. .   They will be here at 10:30 AM!  PCS on bedside chart.     CM sent updates to [facility] via [employer].     3:38 PM   Line has not been placed yet.  RN called team to have them come back  [facility] has a bed for Pt today.     Pt is in agreement with Plan.   CM called Pt Rosa Segura and had to leave a .    3:21 PM   CM talked to Hospitalist.  Pt is now stable for DC.   DC order is in  Line Team is placing a midline.   CM called Hero with [facility] and they are going to check to see if they have a bed today or tomorrow and get back with CM    3:06 PM   [facility] accepted.   Pt does have Medicare.  CM will continue to update them.     12:49 PM   NAHID talked to Pt Rosa SEGURA Connor: [phone] and updated her.  She indicated that Pt wife  in  and that she helped him do everything.  He has been slowly declining since then.  Pt had R BKA in 2024.  [facility]  She reported that he has Medicare.  CM encouraged DTR to look into more help for him at [facility] and if it not

## 2025-07-30 ENCOUNTER — OFFICE VISIT (OUTPATIENT)
Facility: CLINIC | Age: 75
End: 2025-07-30
Payer: OTHER GOVERNMENT

## 2025-07-30 VITALS
WEIGHT: 197 LBS | HEART RATE: 66 BPM | DIASTOLIC BLOOD PRESSURE: 66 MMHG | RESPIRATION RATE: 16 BRPM | TEMPERATURE: 97.2 F | SYSTOLIC BLOOD PRESSURE: 119 MMHG | BODY MASS INDEX: 27.48 KG/M2 | OXYGEN SATURATION: 97 %

## 2025-07-30 VITALS
RESPIRATION RATE: 20 BRPM | DIASTOLIC BLOOD PRESSURE: 101 MMHG | WEIGHT: 218.03 LBS | HEIGHT: 71 IN | TEMPERATURE: 98 F | SYSTOLIC BLOOD PRESSURE: 149 MMHG | OXYGEN SATURATION: 95 % | BODY MASS INDEX: 30.52 KG/M2 | HEART RATE: 66 BPM

## 2025-07-30 DIAGNOSIS — B95.61 STAPHYLOCOCCUS AUREUS BACTEREMIA: ICD-10-CM

## 2025-07-30 DIAGNOSIS — R78.81 MSSA BACTEREMIA: ICD-10-CM

## 2025-07-30 DIAGNOSIS — L97.522 SKIN ULCER OF TOE OF LEFT FOOT WITH FAT LAYER EXPOSED (HCC): ICD-10-CM

## 2025-07-30 DIAGNOSIS — I10 PRIMARY HYPERTENSION: ICD-10-CM

## 2025-07-30 DIAGNOSIS — E10.10 DKA, TYPE 1, NOT AT GOAL (HCC): Primary | ICD-10-CM

## 2025-07-30 DIAGNOSIS — M54.2 NECK PAIN: ICD-10-CM

## 2025-07-30 DIAGNOSIS — E78.2 MIXED HYPERLIPIDEMIA: ICD-10-CM

## 2025-07-30 DIAGNOSIS — Z89.511 HX OF RIGHT BKA (HCC): ICD-10-CM

## 2025-07-30 DIAGNOSIS — R78.81 STAPHYLOCOCCUS AUREUS BACTEREMIA: ICD-10-CM

## 2025-07-30 DIAGNOSIS — B95.61 MSSA BACTEREMIA: ICD-10-CM

## 2025-07-30 LAB
ANION GAP SERPL CALC-SCNC: 10 MMOL/L (ref 2–14)
BASOPHILS # BLD: 0.01 K/UL (ref 0–0.1)
BASOPHILS NFR BLD: 0.1 % (ref 0–1)
BUN SERPL-MCNC: 19 MG/DL (ref 8–23)
BUN/CREAT SERPL: 22 (ref 12–20)
CALCIUM SERPL-MCNC: 9.4 MG/DL (ref 8.8–10.2)
CHLORIDE SERPL-SCNC: 99 MMOL/L (ref 98–107)
CO2 SERPL-SCNC: 27 MMOL/L (ref 20–29)
CREAT SERPL-MCNC: 0.87 MG/DL (ref 0.7–1.2)
DIFFERENTIAL METHOD BLD: ABNORMAL
EOSINOPHIL # BLD: 0.01 K/UL (ref 0–0.4)
EOSINOPHIL NFR BLD: 0.1 % (ref 0–7)
ERYTHROCYTE [DISTWIDTH] IN BLOOD BY AUTOMATED COUNT: 12.9 % (ref 11.5–14.5)
GLUCOSE BLD STRIP.AUTO-MCNC: 240 MG/DL (ref 65–117)
GLUCOSE BLD STRIP.AUTO-MCNC: 338 MG/DL (ref 65–117)
GLUCOSE SERPL-MCNC: 253 MG/DL (ref 65–100)
HCT VFR BLD AUTO: 36.9 % (ref 36.6–50.3)
HGB BLD-MCNC: 12.6 G/DL (ref 12.1–17)
IMM GRANULOCYTES # BLD AUTO: 0.2 K/UL (ref 0–0.04)
IMM GRANULOCYTES NFR BLD AUTO: 1.8 % (ref 0–0.5)
LYMPHOCYTES # BLD: 1.85 K/UL (ref 0.8–3.5)
LYMPHOCYTES NFR BLD: 16.7 % (ref 12–49)
MCH RBC QN AUTO: 29.3 PG (ref 26–34)
MCHC RBC AUTO-ENTMCNC: 34.1 G/DL (ref 30–36.5)
MCV RBC AUTO: 85.8 FL (ref 80–99)
MONOCYTES # BLD: 1.06 K/UL (ref 0–1)
MONOCYTES NFR BLD: 9.5 % (ref 5–13)
NEUTS SEG # BLD: 7.97 K/UL (ref 1.8–8)
NEUTS SEG NFR BLD: 71.8 % (ref 32–75)
NRBC # BLD: 0 K/UL (ref 0–0.01)
NRBC BLD-RTO: 0 PER 100 WBC
PLATELET # BLD AUTO: 281 K/UL (ref 150–400)
PMV BLD AUTO: 9.8 FL (ref 8.9–12.9)
POTASSIUM SERPL-SCNC: 4.1 MMOL/L (ref 3.5–5.1)
RBC # BLD AUTO: 4.3 M/UL (ref 4.1–5.7)
SERVICE CMNT-IMP: ABNORMAL
SERVICE CMNT-IMP: ABNORMAL
SODIUM SERPL-SCNC: 136 MMOL/L (ref 136–145)
WBC # BLD AUTO: 11.1 K/UL (ref 4.1–11.1)

## 2025-07-30 PROCEDURE — 85025 COMPLETE CBC W/AUTO DIFF WBC: CPT

## 2025-07-30 PROCEDURE — 6360000002 HC RX W HCPCS: Performed by: INTERNAL MEDICINE

## 2025-07-30 PROCEDURE — 99306 1ST NF CARE HIGH MDM 50: CPT | Performed by: FAMILY MEDICINE

## 2025-07-30 PROCEDURE — 2500000003 HC RX 250 WO HCPCS: Performed by: INTERNAL MEDICINE

## 2025-07-30 PROCEDURE — 80048 BASIC METABOLIC PNL TOTAL CA: CPT

## 2025-07-30 PROCEDURE — 6370000000 HC RX 637 (ALT 250 FOR IP): Performed by: PHYSICIAN ASSISTANT

## 2025-07-30 PROCEDURE — 6360000002 HC RX W HCPCS: Performed by: HOSPITALIST

## 2025-07-30 PROCEDURE — 6370000000 HC RX 637 (ALT 250 FOR IP): Performed by: INTERNAL MEDICINE

## 2025-07-30 PROCEDURE — 82962 GLUCOSE BLOOD TEST: CPT

## 2025-07-30 PROCEDURE — 6370000000 HC RX 637 (ALT 250 FOR IP): Performed by: HOSPITALIST

## 2025-07-30 PROCEDURE — 3046F HEMOGLOBIN A1C LEVEL >9.0%: CPT | Performed by: FAMILY MEDICINE

## 2025-07-30 PROCEDURE — 36415 COLL VENOUS BLD VENIPUNCTURE: CPT

## 2025-07-30 PROCEDURE — 6370000000 HC RX 637 (ALT 250 FOR IP)

## 2025-07-30 PROCEDURE — 1124F ACP DISCUSS-NO DSCNMKR DOCD: CPT | Performed by: FAMILY MEDICINE

## 2025-07-30 RX ORDER — INSULIN GLARGINE 100 [IU]/ML
32 INJECTION, SOLUTION SUBCUTANEOUS DAILY
Qty: 10 ML | Refills: 3 | Status: SHIPPED | OUTPATIENT
Start: 2025-07-31 | End: 2025-07-30

## 2025-07-30 RX ORDER — CEFAZOLIN SODIUM 1 G/3ML
2000 INJECTION, POWDER, FOR SOLUTION INTRAMUSCULAR; INTRAVENOUS EVERY 6 HOURS
Qty: 29 EACH | Refills: 0 | Status: SHIPPED | OUTPATIENT
Start: 2025-07-30 | End: 2025-07-30

## 2025-07-30 RX ORDER — OXYCODONE HYDROCHLORIDE 5 MG/1
5 TABLET ORAL EVERY 4 HOURS PRN
Qty: 18 TABLET | Refills: 0 | Status: SHIPPED | OUTPATIENT
Start: 2025-07-30 | End: 2025-07-30

## 2025-07-30 RX ORDER — OXYCODONE HYDROCHLORIDE 5 MG/1
5 TABLET ORAL EVERY 4 HOURS PRN
Qty: 18 TABLET | Refills: 0 | Status: SHIPPED | OUTPATIENT
Start: 2025-07-30 | End: 2025-08-02

## 2025-07-30 RX ORDER — MECLIZINE HCL 12.5 MG 12.5 MG/1
12.5 TABLET ORAL 3 TIMES DAILY PRN
Qty: 30 TABLET | Refills: 1 | Status: SHIPPED | OUTPATIENT
Start: 2025-07-30

## 2025-07-30 RX ORDER — INSULIN GLARGINE 100 [IU]/ML
35 INJECTION, SOLUTION SUBCUTANEOUS DAILY
Qty: 10 ML | Refills: 3 | Status: SHIPPED | OUTPATIENT
Start: 2025-07-31

## 2025-07-30 RX ORDER — OXYCODONE HYDROCHLORIDE 5 MG/1
5 TABLET ORAL EVERY 4 HOURS PRN
Qty: 18 TABLET | Refills: 0 | Status: SHIPPED | OUTPATIENT
Start: 2025-07-30 | End: 2025-07-30 | Stop reason: SDUPTHER

## 2025-07-30 RX ORDER — INSULIN LISPRO 100 [IU]/ML
10 INJECTION, SOLUTION INTRAVENOUS; SUBCUTANEOUS
Qty: 3 EACH | Refills: 1 | Status: SHIPPED | OUTPATIENT
Start: 2025-07-30

## 2025-07-30 RX ORDER — CEFAZOLIN SODIUM 1 G/3ML
2000 INJECTION, POWDER, FOR SOLUTION INTRAMUSCULAR; INTRAVENOUS EVERY 8 HOURS
Qty: 42 EACH | Refills: 0 | Status: SHIPPED
Start: 2025-07-30 | End: 2025-08-06

## 2025-07-30 RX ADMIN — PREDNISOLONE ACETATE 1 DROP: 10 SUSPENSION/ DROPS OPHTHALMIC at 09:14

## 2025-07-30 RX ADMIN — INSULIN LISPRO 10 UNITS: 100 INJECTION, SOLUTION INTRAVENOUS; SUBCUTANEOUS at 09:08

## 2025-07-30 RX ADMIN — TIMOLOL MALEATE 1 DROP: 5 SOLUTION OPHTHALMIC at 09:14

## 2025-07-30 RX ADMIN — WATER 2000 MG: 1 INJECTION INTRAMUSCULAR; INTRAVENOUS; SUBCUTANEOUS at 09:10

## 2025-07-30 RX ADMIN — CARVEDILOL 25 MG: 12.5 TABLET, FILM COATED ORAL at 09:08

## 2025-07-30 RX ADMIN — MECLIZINE 12.5 MG: 12.5 TABLET ORAL at 05:53

## 2025-07-30 RX ADMIN — OXYCODONE 5 MG: 5 TABLET ORAL at 09:08

## 2025-07-30 RX ADMIN — WATER 2000 MG: 1 INJECTION INTRAMUSCULAR; INTRAVENOUS; SUBCUTANEOUS at 00:49

## 2025-07-30 RX ADMIN — INSULIN GLARGINE 32 UNITS: 100 INJECTION, SOLUTION SUBCUTANEOUS at 09:07

## 2025-07-30 RX ADMIN — SODIUM CHLORIDE, PRESERVATIVE FREE 10 ML: 5 INJECTION INTRAVENOUS at 09:16

## 2025-07-30 RX ADMIN — INSULIN LISPRO 2 UNITS: 100 INJECTION, SOLUTION INTRAVENOUS; SUBCUTANEOUS at 09:07

## 2025-07-30 RX ADMIN — ENOXAPARIN SODIUM 30 MG: 100 INJECTION SUBCUTANEOUS at 09:10

## 2025-07-30 RX ADMIN — DICLOFENAC SODIUM 2 G: 10 GEL TOPICAL at 09:13

## 2025-07-30 RX ADMIN — AMLODIPINE BESYLATE 10 MG: 5 TABLET ORAL at 09:08

## 2025-07-30 RX ADMIN — OXYCODONE 5 MG: 5 TABLET ORAL at 03:44

## 2025-07-30 ASSESSMENT — PAIN DESCRIPTION - ONSET: ONSET: ON-GOING

## 2025-07-30 ASSESSMENT — PAIN DESCRIPTION - DESCRIPTORS
DESCRIPTORS: ACHING;SHOOTING
DESCRIPTORS: PRESSURE

## 2025-07-30 ASSESSMENT — PAIN DESCRIPTION - PAIN TYPE: TYPE: CHRONIC PAIN;ACUTE PAIN

## 2025-07-30 ASSESSMENT — PAIN DESCRIPTION - ORIENTATION: ORIENTATION: ANTERIOR

## 2025-07-30 ASSESSMENT — PAIN DESCRIPTION - FREQUENCY: FREQUENCY: CONTINUOUS

## 2025-07-30 ASSESSMENT — PAIN DESCRIPTION - LOCATION
LOCATION: NECK
LOCATION: HEAD

## 2025-07-30 ASSESSMENT — PAIN SCALES - GENERAL
PAINLEVEL_OUTOF10: 8
PAINLEVEL_OUTOF10: 7
PAINLEVEL_OUTOF10: 8
PAINLEVEL_OUTOF10: 7

## 2025-07-30 ASSESSMENT — PAIN - FUNCTIONAL ASSESSMENT: PAIN_FUNCTIONAL_ASSESSMENT: PREVENTS OR INTERFERES SOME ACTIVE ACTIVITIES AND ADLS

## 2025-07-30 NOTE — PROGRESS NOTES
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 Multilevel spinal canal stenosis and/or spinal cord compression, overall  most severe at C5-C6. Multilevel varying foraminal stenoses as above. C4-C5 and  C5-C6 chronic myelomalacia.  Orders:    oxyCODONE (ROXICODONE) 5 MG immediate release tablet; Take 1 tablet by mouth every 4 hours as needed for Pain for up to 3 days. Max Daily Amount: 30 mg    Primary hypertension     Continue amlodipine and carvedilol       Mixed hyperlipidemia     Continue atorvastatin       Hx of right BKA (HCC)     Has not been wearing prosthesis because it hasn't fit adequately     MSSA bacteremia     Continue cefazolin  Uncertain source of infection       Skin ulcer of toe of left foot with fat layer exposed (HCC)     Ulcer bed dry  MRI of the left foot shows ulceration in the great toe without any evidence of acute osteomyelitis and shows mild to moderate osteoarthritis and also has signs of sesamoiditis           The patient is admitted to the SNF for rehabilitation for multiple medical issues as listed above.  will be consulted for discharge planning.       SUBJECTIVE:    Chief Complaint:  Chief Complaint   Patient presents with    New Patient     Admitted from hospitalization 7/22-30/25 for DKA and encephalopathy.     History of Present Illness:    Alexandro Ortiz is

## 2025-07-30 NOTE — DISCHARGE INSTRUCTIONS
please follow-up with the primary care doctor after the discharge and get evaluation for the need of pain medication if clinically indicated    Please follow-up with the primary care doctor for the need to restart lisinopril based on your blood pressure if clinically indicated      Please follow-up with a primary care doctor to adjust the insulin dose if clinically indicated    If you have a dizziness, lightheadedness, abdominal pain, nausea, vomiting, weakness in any part of the body, loss of bowel and bladder please come back to emergency department immediately.

## 2025-07-30 NOTE — PLAN OF CARE
Problem: Occupational Therapy - Adult  Goal: By Discharge: Performs self-care activities at highest level of function for planned discharge setting.  See evaluation for individualized goals.  Description: FUNCTIONAL STATUS PRIOR TO ADMISSION:  rarely uses prosthetic due to pain from ill fit, was using prosthesis to manage steps in and out of home but only went out for appointments, sits on porch in his wheelchair, wheelchair doesn't fit into bathroom and pt uses axillary crutches to get into bathroom and on steps, was cooking light breakfast only and warms meals otherwise, relies on friends and family for transportation and groceries, sits on shower chair to bathe and reports he transfers from toilet directly to tub (tight bathroom), to stand pt brings his residual limb into weight bearing on the end of his stump to achieve standing, multiple falls with wheelchair moving or chair moving, reports brakes do work on his wheelchair, sleeps on recliner   ,  ,  ,  ,  ,  ,  ,  ,  ,  , Active : No     HOME SUPPORT: Patient lived alone and has friends and family members to assist at times.    Occupational Therapy Goals:  Initiated 7/26/2025  1.  Patient will perform grooming seated with good balance and modified independence once items provided within 7 day(s).  2.  Patient will perform upper body dressing with Set-up within 7 day(s).  3.  Patient will perform lower body dressing with Set-up within 7 day(s).  4.  Patient will perform toilet or bedside commode transfers with Contact Guard Assist  within 7 day(s).  5.  Patient will perform all aspects of toileting with Contact Guard Assist within 7 day(s).        Outcome: Progressing   OCCUPATIONAL THERAPY TREATMENT  Patient: Alexandro Ortiz (75 y.o. male)  Date: 7/29/2025  Primary Diagnosis: DKA, type 1, not at goal (HCC) [E10.10]  Altered mental status, unspecified altered mental status type [R41.82]  Diabetic ketoacidosis without coma associated with other 
  Problem: Occupational Therapy - Adult  Goal: By Discharge: Performs self-care activities at highest level of function for planned discharge setting.  See evaluation for individualized goals.  Description: FUNCTIONAL STATUS PRIOR TO ADMISSION:  rarely uses prosthetic due to pain from ill fit, was using prosthesis to manage steps in and out of home but only went out for appointments, sits on porch in his wheelchair, wheelchair doesn't fit into bathroom and pt uses axillary crutches to get into bathroom and on steps, was cooking light breakfast only and warms meals otherwise, relies on friends and family for transportation and groceries, sits on shower chair to bathe and reports he transfers from toilet directly to tub (tight bathroom), to stand pt brings his residual limb into weight bearing on the end of his stump to achieve standing, multiple falls with wheelchair moving or chair moving, reports brakes do work on his wheelchair, sleeps on recliner   ,  ,  ,  ,  ,  ,  ,  ,  ,  , Active : No     HOME SUPPORT: Patient lived alone and has friends and family members to assist at times.    Occupational Therapy Goals:  Initiated 7/26/2025  1.  Patient will perform grooming seated with good balance and modified independence once items provided within 7 day(s).  2.  Patient will perform upper body dressing with Set-up within 7 day(s).  3.  Patient will perform lower body dressing with Set-up within 7 day(s).  4.  Patient will perform toilet or bedside commode transfers with Contact Guard Assist  within 7 day(s).  5.  Patient will perform all aspects of toileting with Contact Guard Assist within 7 day(s).        Outcome: Progressing  OCCUPATIONAL THERAPY EVALUATION    Patient: Alexandro Ortiz (75 y.o. male)  Date: 7/26/2025  Primary Diagnosis: DKA, type 1, not at goal (HCC) [E10.10]  Altered mental status, unspecified altered mental status type [R41.82]  Diabetic ketoacidosis without coma associated with other 
  Problem: Physical Therapy - Adult  Goal: By Discharge: Performs mobility at highest level of function for planned discharge setting.  See evaluation for individualized goals.  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  Has RW and crutches per patient as well as a prosthesis that does not fit him well.  He does not wear prosthesis secondary to pain and only using w/c.  Normally able to dress and bathe himself and transfers in and out of his chair on his own.  Has stairs to enter/exit the home so \"does not go out\" per patient but \"can make it happen if I need to\".    HOME SUPPORT PRIOR TO ADMISSION: The patient lived alone with niece that checks on him 3x/week to provide intermittent assistance.      Physical Therapy Goals  Initiated 7/24/2025  1.  Patient will move from supine to sit and sit to supine in bed with supervision within 7 day(s).    2.  Patient will perform sit to stand with Trey within 7 day(s).  3.  Patient will transfer from bed to chair and chair to bed with minimal assistance using the least restrictive device within 7 day(s).    Outcome: Progressing   PHYSICAL THERAPY EVALUATION    Patient: Alexandro Ortiz (75 y.o. male)  Date: 7/24/2025  Primary Diagnosis: DKA, type 1, not at goal (HCC) [E10.10]  Altered mental status, unspecified altered mental status type [R41.82]  Diabetic ketoacidosis without coma associated with other specified diabetes mellitus (HCC) [E13.10]  Sepsis, due to unspecified organism, unspecified whether acute organ dysfunction present (HCC) [A41.9]       Precautions: Restrictions/Precautions  Restrictions/Precautions: Fall Risk (R BKA)            ASSESSMENT :   DEFICITS/IMPAIRMENTS:   The patient is limited by generalized weakness, R BKA, pain and decreased activity tolerance.  Currently patient require some encouragement to participate but otherwise participates well.  Requires 
  Problem: Physical Therapy - Adult  Goal: By Discharge: Performs mobility at highest level of function for planned discharge setting.  See evaluation for individualized goals.  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  Has RW and crutches per patient as well as a prosthesis that does not fit him well.  He does not wear prosthesis secondary to pain and only using w/c.  Normally able to dress and bathe himself and transfers in and out of his chair on his own.  Has stairs to enter/exit the home so \"does not go out\" per patient but \"can make it happen if I need to\".    HOME SUPPORT PRIOR TO ADMISSION: The patient lived alone with niece that checks on him 3x/week to provide intermittent assistance.      Physical Therapy Goals  Initiated 7/24/2025  1.  Patient will move from supine to sit and sit to supine in bed with supervision within 7 day(s).    2.  Patient will perform sit to stand with Trey within 7 day(s).  3.  Patient will transfer from bed to chair and chair to bed with minimal assistance using the least restrictive device within 7 day(s).    Outcome: Progressing   PHYSICAL THERAPY TREATMENT    Patient: Alexandro Ortiz (75 y.o. male)  Date: 7/28/2025  Diagnosis: DKA, type 1, not at goal (HCC) [E10.10]  Altered mental status, unspecified altered mental status type [R41.82]  Diabetic ketoacidosis without coma associated with other specified diabetes mellitus (HCC) [E13.10]  Sepsis, due to unspecified organism, unspecified whether acute organ dysfunction present (HCC) [A41.9] DKA, type 1, not at goal (HCC)      Precautions: Restrictions/Precautions  Restrictions/Precautions: Bed Alarm, Fall Risk (R BKA)            ASSESSMENT:  Patient continues to benefit from skilled PT services and is slowly progressing towards goals. He continues to report HA and neck pain with any movement of his head. Patient reports pain with 
  Problem: Safety - Adult  Goal: Free from fall injury  7/23/2025 1328 by Lisa Clark RN  Outcome: Progressing  7/23/2025 1328 by Lisa Clark RN  Outcome: Progressing     Problem: Chronic Conditions and Co-morbidities  Goal: Patient's chronic conditions and co-morbidity symptoms are monitored and maintained or improved  7/23/2025 1328 by Lisa Clark RN  Outcome: Progressing  Flowsheets (Taken 7/23/2025 0745 by Jessica Alicea RN)  Care Plan - Patient's Chronic Conditions and Co-Morbidity Symptoms are Monitored and Maintained or Improved: Monitor and assess patient's chronic conditions and comorbid symptoms for stability, deterioration, or improvement  7/23/2025 1328 by Lisa Clark RN  Outcome: Progressing  Flowsheets (Taken 7/23/2025 0745 by Jessica Alicea RN)  Care Plan - Patient's Chronic Conditions and Co-Morbidity Symptoms are Monitored and Maintained or Improved: Monitor and assess patient's chronic conditions and comorbid symptoms for stability, deterioration, or improvement     Problem: Discharge Planning  Goal: Discharge to home or other facility with appropriate resources  7/23/2025 1328 by Lisa Clark RN  Outcome: Progressing  Flowsheets (Taken 7/23/2025 0745 by Jessica Alicea, RN)  Discharge to home or other facility with appropriate resources:   Identify barriers to discharge with patient and caregiver   Arrange for needed discharge resources and transportation as appropriate  7/23/2025 1328 by Lisa Clark RN  Outcome: Progressing  Flowsheets (Taken 7/23/2025 0745 by Jessica Alicea RN)  Discharge to home or other facility with appropriate resources:   Identify barriers to discharge with patient and caregiver   Arrange for needed discharge resources and transportation as appropriate     Problem: Pain  Goal: Verbalizes/displays adequate comfort level or baseline comfort level  7/23/2025 1328 by Lisa Clark RN  Outcome: Progressing  Flowsheets (Taken 
  Problem: Safety - Adult  Goal: Free from fall injury  7/23/2025 1848 by Rachel Mata RN  Outcome: Progressing  7/23/2025 1328 by Lisa Clark RN  Outcome: Progressing     Problem: Chronic Conditions and Co-morbidities  Goal: Patient's chronic conditions and co-morbidity symptoms are monitored and maintained or improved  7/23/2025 1848 by Rachel Mata RN  Outcome: Progressing  7/23/2025 1328 by Lisa Clark RN  Outcome: Progressing  Flowsheets (Taken 7/23/2025 0745 by Jessica Alicea, RN)  Care Plan - Patient's Chronic Conditions and Co-Morbidity Symptoms are Monitored and Maintained or Improved: Monitor and assess patient's chronic conditions and comorbid symptoms for stability, deterioration, or improvement     Problem: Discharge Planning  Goal: Discharge to home or other facility with appropriate resources  7/23/2025 1848 by Rachel Mata RN  Outcome: Progressing  7/23/2025 1328 by Lisa Clark RN  Outcome: Progressing  Flowsheets (Taken 7/23/2025 0745 by Jessica Alicea RN)  Discharge to home or other facility with appropriate resources:   Identify barriers to discharge with patient and caregiver   Arrange for needed discharge resources and transportation as appropriate     Problem: Pain  Goal: Verbalizes/displays adequate comfort level or baseline comfort level  7/23/2025 1848 by Rachel Mata RN  Outcome: Progressing  7/23/2025 1328 by Lisa Clark RN  Outcome: Progressing  Flowsheets (Taken 7/23/2025 0745)  Verbalizes/displays adequate comfort level or baseline comfort level:   Encourage patient to monitor pain and request assistance   Assess pain using appropriate pain scale   Administer analgesics based on type and severity of pain and evaluate response   Implement non-pharmacological measures as appropriate and evaluate response     Problem: Skin/Tissue Integrity  Goal: Skin integrity remains intact  Description: 1.  Monitor for areas of redness and/or skin 
  Problem: Safety - Adult  Goal: Free from fall injury  7/26/2025 0113 by Юлия Chen RN  Outcome: Progressing  7/25/2025 1831 by Prince Pierson RN  Outcome: Progressing     Problem: Chronic Conditions and Co-morbidities  Goal: Patient's chronic conditions and co-morbidity symptoms are monitored and maintained or improved  7/26/2025 0113 by Юлия Chen RN  Outcome: Progressing  7/25/2025 1831 by Prince Pierson RN  Outcome: Progressing     Problem: Discharge Planning  Goal: Discharge to home or other facility with appropriate resources  7/26/2025 0113 by Юлия Chen RN  Outcome: Progressing  7/25/2025 1831 by Prince Pierson RN  Outcome: Progressing     Problem: Pain  Goal: Verbalizes/displays adequate comfort level or baseline comfort level  Outcome: Progressing     Problem: Skin/Tissue Integrity  Goal: Skin integrity remains intact  Description: 1.  Monitor for areas of redness and/or skin breakdown  2.  Assess vascular access sites hourly  3.  Every 4-6 hours minimum:  Change oxygen saturation probe site  4.  Every 4-6 hours:  If on nasal continuous positive airway pressure, respiratory therapy assess nares and determine need for appliance change or resting period  7/26/2025 0113 by Юлия Chen RN  Outcome: Progressing  7/25/2025 1831 by Prince Pierson RN  Outcome: Progressing     Problem: Neurosensory - Adult  Goal: Achieves stable or improved neurological status  7/26/2025 0113 by Юлия Chen RN  Outcome: Progressing  7/25/2025 1831 by Prince Pierson RN  Outcome: Progressing  Flowsheets (Taken 7/25/2025 0737)  Achieves stable or improved neurological status: Assess for and report changes in neurological status  Goal: Achieves maximal functionality and self care  Outcome: Progressing     Problem: Respiratory - Adult  Goal: Achieves optimal ventilation and oxygenation  7/26/2025 0113 by Юлия Chen RN  Outcome: Progressing  7/25/2025 1831 by 
  Problem: Safety - Adult  Goal: Free from fall injury  7/27/2025 0329 by Penelope Menchaca RN  Outcome: Progressing  7/26/2025 1642 by Hilario Pak RN  Outcome: Progressing     Problem: Chronic Conditions and Co-morbidities  Goal: Patient's chronic conditions and co-morbidity symptoms are monitored and maintained or improved  7/27/2025 0329 by Penelope Menchaca RN  Outcome: Progressing  7/26/2025 1642 by Hilario Pak RN  Outcome: Progressing     Problem: Discharge Planning  Goal: Discharge to home or other facility with appropriate resources  7/27/2025 0329 by Penelope Menchaca RN  Outcome: Progressing  7/26/2025 1642 by Hilario Pak RN  Outcome: Progressing     Problem: Pain  Goal: Verbalizes/displays adequate comfort level or baseline comfort level  7/27/2025 0329 by Penelope Menchaca RN  Outcome: Progressing  7/26/2025 1642 by Hilario Pak RN  Outcome: Progressing     Problem: Skin/Tissue Integrity  Goal: Skin integrity remains intact  Description: 1.  Monitor for areas of redness and/or skin breakdown  2.  Assess vascular access sites hourly  3.  Every 4-6 hours minimum:  Change oxygen saturation probe site  4.  Every 4-6 hours:  If on nasal continuous positive airway pressure, respiratory therapy assess nares and determine need for appliance change or resting period  7/27/2025 0329 by Penelope Menchaca RN  Outcome: Progressing  Flowsheets (Taken 7/26/2025 1950)  Skin Integrity Remains Intact:   Monitor for areas of redness and/or skin breakdown   Pressure redistribution bed/mattress (bed type)  7/26/2025 1642 by Hilario Pak RN  Outcome: Progressing     Problem: Neurosensory - Adult  Goal: Achieves stable or improved neurological status  7/27/2025 0329 by Penelope Menchaca RN  Outcome: Progressing  7/26/2025 1642 by Hilario Pak RN  Outcome: Progressing  Goal: Achieves maximal functionality and self care  Outcome: Progressing     Problem: Respiratory - Adult  Goal: Achieves optimal 
  Problem: Safety - Adult  Goal: Free from fall injury  7/27/2025 1441 by Hilario Pak RN  Outcome: Progressing  7/27/2025 0329 by Penelope Menchaca RN  Outcome: Progressing     Problem: Chronic Conditions and Co-morbidities  Goal: Patient's chronic conditions and co-morbidity symptoms are monitored and maintained or improved  7/27/2025 1441 by Hilario Pak RN  Outcome: Progressing  7/27/2025 0329 by Penelope Menchaca RN  Outcome: Progressing     Problem: Discharge Planning  Goal: Discharge to home or other facility with appropriate resources  7/27/2025 1441 by Hilario Pak RN  Outcome: Progressing  7/27/2025 0329 by Penelope Menchaca RN  Outcome: Progressing     Problem: Discharge Planning  Goal: Discharge to home or other facility with appropriate resources  7/27/2025 1441 by Hilario Pak RN  Outcome: Progressing  7/27/2025 0329 by Penelope Menchaca RN  Outcome: Progressing     Problem: Pain  Goal: Verbalizes/displays adequate comfort level or baseline comfort level  7/27/2025 1441 by Hilario Pak RN  Outcome: Progressing  7/27/2025 0329 by Penelope Menchaca RN  Outcome: Progressing     Problem: Neurosensory - Adult  Goal: Achieves stable or improved neurological status  7/27/2025 1441 by Hilario Pak RN  Outcome: Progressing  7/27/2025 0329 by Penelope Menchaca RN  Outcome: Progressing  Goal: Achieves maximal functionality and self care  7/27/2025 1441 by Hilario Pak RN  Outcome: Progressing  7/27/2025 0329 by Penelope Menchaca RN  Outcome: Progressing     Problem: Cardiovascular - Adult  Goal: Maintains optimal cardiac output and hemodynamic stability  7/27/2025 1441 by Hilario Pak RN  Outcome: Progressing  7/27/2025 0329 by Penelope Menchaca RN  Outcome: Progressing     Problem: Skin/Tissue Integrity - Adult  Goal: Oral mucous membranes remain intact  7/27/2025 1441 by Hilario Pak RN  Outcome: Progressing  7/27/2025 0329 by Penelope Menchaca RN  Outcome: Progressing     
  Problem: Safety - Adult  Goal: Free from fall injury  7/29/2025 0143 by Elda Monge RN  Outcome: Progressing  7/28/2025 1837 by Karen Scott RN  Outcome: Progressing     Problem: Chronic Conditions and Co-morbidities  Goal: Patient's chronic conditions and co-morbidity symptoms are monitored and maintained or improved  7/29/2025 0143 by Elda Monge RN  Outcome: Progressing  7/28/2025 1837 by Karen Scott RN  Outcome: Progressing     Problem: Discharge Planning  Goal: Discharge to home or other facility with appropriate resources  7/29/2025 0143 by Elda Monge RN  Outcome: Progressing  7/28/2025 1837 by Karen Scott RN  Outcome: Progressing     Problem: Pain  Goal: Verbalizes/displays adequate comfort level or baseline comfort level  7/29/2025 0143 by Elda Monge RN  Outcome: Progressing  7/28/2025 1837 by Karen Scott RN  Outcome: Progressing     Problem: Skin/Tissue Integrity  Goal: Skin integrity remains intact  Description: 1.  Monitor for areas of redness and/or skin breakdown  2.  Assess vascular access sites hourly  3.  Every 4-6 hours minimum:  Change oxygen saturation probe site  4.  Every 4-6 hours:  If on nasal continuous positive airway pressure, respiratory therapy assess nares and determine need for appliance change or resting period  7/29/2025 0143 by Elda Monge RN  Outcome: Progressing  Flowsheets (Taken 7/28/2025 2050)  Skin Integrity Remains Intact:   Monitor for areas of redness and/or skin breakdown   Assess vascular access sites hourly  7/28/2025 1837 by Karen Scott RN  Outcome: Progressing     Problem: Neurosensory - Adult  Goal: Achieves stable or improved neurological status  7/29/2025 0143 by Elda Monge RN  Outcome: Progressing  7/28/2025 1837 by Karen Scott RN  Outcome: Progressing  Goal: Achieves maximal functionality and self care  7/29/2025 0143 by Elda Monge RN  Outcome: Progressing  7/28/2025 1837 by Karen Scott 
  Problem: Safety - Adult  Goal: Free from fall injury  7/29/2025 0814 by Юлия Whaley RN  Outcome: Progressing  7/29/2025 0143 by Elda Monge RN  Outcome: Progressing  7/28/2025 1837 by Karen Scott RN  Outcome: Progressing     Problem: Chronic Conditions and Co-morbidities  Goal: Patient's chronic conditions and co-morbidity symptoms are monitored and maintained or improved  7/29/2025 0814 by Юлия Whaley RN  Outcome: Progressing  7/29/2025 0143 by Elda Monge RN  Outcome: Progressing  7/28/2025 1837 by Karen Scott RN  Outcome: Progressing     Problem: Discharge Planning  Goal: Discharge to home or other facility with appropriate resources  7/29/2025 0814 by Юлия Whaley RN  Outcome: Progressing  7/29/2025 0143 by Elda Monge RN  Outcome: Progressing  7/28/2025 1837 by Karen Scott RN  Outcome: Progressing     Problem: Pain  Goal: Verbalizes/displays adequate comfort level or baseline comfort level  7/29/2025 0814 by Юлия Whaley RN  Outcome: Progressing  7/29/2025 0143 by Elda Monge RN  Outcome: Progressing  7/28/2025 1837 by Karen Scott RN  Outcome: Progressing     Problem: Skin/Tissue Integrity  Goal: Skin integrity remains intact  Description: 1.  Monitor for areas of redness and/or skin breakdown  2.  Assess vascular access sites hourly  3.  Every 4-6 hours minimum:  Change oxygen saturation probe site  4.  Every 4-6 hours:  If on nasal continuous positive airway pressure, respiratory therapy assess nares and determine need for appliance change or resting period  7/29/2025 0814 by Юлия Whaley RN  Outcome: Progressing  7/29/2025 0143 by Elda Monge RN  Outcome: Progressing  Flowsheets (Taken 7/28/2025 2050)  Skin Integrity Remains Intact:   Monitor for areas of redness and/or skin breakdown   Assess vascular access sites hourly  7/28/2025 1837 by Karen Scott RN  Outcome: Progressing     Problem: Neurosensory - Adult  Goal: Achieves stable or 
  Problem: Safety - Adult  Goal: Free from fall injury  7/30/2025 1112 by Natalie Souza RN  Outcome: Progressing  7/29/2025 2144 by Sally Malik RN  Outcome: Progressing     Problem: Chronic Conditions and Co-morbidities  Goal: Patient's chronic conditions and co-morbidity symptoms are monitored and maintained or improved  7/30/2025 1112 by Natalie Souza RN  Outcome: Progressing  Flowsheets (Taken 7/30/2025 0730)  Care Plan - Patient's Chronic Conditions and Co-Morbidity Symptoms are Monitored and Maintained or Improved:   Monitor and assess patient's chronic conditions and comorbid symptoms for stability, deterioration, or improvement   Collaborate with multidisciplinary team to address chronic and comorbid conditions and prevent exacerbation or deterioration   Update acute care plan with appropriate goals if chronic or comorbid symptoms are exacerbated and prevent overall improvement and discharge  7/29/2025 2144 by Sally Malik RN  Outcome: Progressing  Flowsheets (Taken 7/29/2025 1910)  Care Plan - Patient's Chronic Conditions and Co-Morbidity Symptoms are Monitored and Maintained or Improved: Monitor and assess patient's chronic conditions and comorbid symptoms for stability, deterioration, or improvement     Problem: Discharge Planning  Goal: Discharge to home or other facility with appropriate resources  7/30/2025 1112 by Natalie Suoza RN  Outcome: Progressing  Flowsheets (Taken 7/30/2025 0730)  Discharge to home or other facility with appropriate resources:   Identify barriers to discharge with patient and caregiver   Arrange for needed discharge resources and transportation as appropriate   Identify discharge learning needs (meds, wound care, etc)  7/29/2025 2144 by Sally Malik RN  Outcome: Progressing  Flowsheets (Taken 7/29/2025 1910)  Discharge to home or other facility with appropriate resources:   Identify barriers to discharge with patient and caregiver   Arrange for needed 
  Problem: Safety - Adult  Goal: Free from fall injury  Outcome: Progressing     Problem: Chronic Conditions and Co-morbidities  Goal: Patient's chronic conditions and co-morbidity symptoms are monitored and maintained or improved  Outcome: Progressing     Problem: Discharge Planning  Goal: Discharge to home or other facility with appropriate resources  Outcome: Progressing     Problem: Pain  Goal: Verbalizes/displays adequate comfort level or baseline comfort level  Outcome: Progressing     Problem: Skin/Tissue Integrity  Goal: Skin integrity remains intact  Description: 1.  Monitor for areas of redness and/or skin breakdown  2.  Assess vascular access sites hourly  3.  Every 4-6 hours minimum:  Change oxygen saturation probe site  4.  Every 4-6 hours:  If on nasal continuous positive airway pressure, respiratory therapy assess nares and determine need for appliance change or resting period  Outcome: Progressing     Problem: Neurosensory - Adult  Goal: Achieves stable or improved neurological status  Outcome: Progressing  Goal: Achieves maximal functionality and self care  Outcome: Progressing     Problem: Respiratory - Adult  Goal: Achieves optimal ventilation and oxygenation  Outcome: Progressing     Problem: Cardiovascular - Adult  Goal: Maintains optimal cardiac output and hemodynamic stability  Outcome: Progressing     Problem: Skin/Tissue Integrity - Adult  Goal: Oral mucous membranes remain intact  Outcome: Progressing     Problem: Musculoskeletal - Adult  Goal: Return mobility to safest level of function  Outcome: Progressing  Goal: Return ADL status to a safe level of function  Outcome: Progressing     Problem: Gastrointestinal - Adult  Goal: Minimal or absence of nausea and vomiting  Outcome: Progressing  Goal: Maintains adequate nutritional intake  Outcome: Progressing     Problem: Genitourinary - Adult  Goal: Absence of urinary retention  Outcome: Progressing     Problem: Metabolic/Fluid and 
  Problem: Safety - Adult  Goal: Free from fall injury  Outcome: Progressing     Problem: Chronic Conditions and Co-morbidities  Goal: Patient's chronic conditions and co-morbidity symptoms are monitored and maintained or improved  Outcome: Progressing     Problem: Discharge Planning  Goal: Discharge to home or other facility with appropriate resources  Outcome: Progressing     Problem: Skin/Tissue Integrity  Goal: Skin integrity remains intact  Description: 1.  Monitor for areas of redness and/or skin breakdown  2.  Assess vascular access sites hourly  3.  Every 4-6 hours minimum:  Change oxygen saturation probe site  4.  Every 4-6 hours:  If on nasal continuous positive airway pressure, respiratory therapy assess nares and determine need for appliance change or resting period  Outcome: Progressing     Problem: Neurosensory - Adult  Goal: Achieves stable or improved neurological status  Outcome: Progressing  Flowsheets (Taken 7/25/2025 0737)  Achieves stable or improved neurological status: Assess for and report changes in neurological status     Problem: Respiratory - Adult  Goal: Achieves optimal ventilation and oxygenation  Outcome: Progressing  Flowsheets (Taken 7/25/2025 0737)  Achieves optimal ventilation and oxygenation: Assess for changes in respiratory status     
  Problem: Safety - Adult  Goal: Free from fall injury  Outcome: Progressing     Problem: Safety - Adult  Goal: Free from fall injury  Outcome: Progressing     Problem: Chronic Conditions and Co-morbidities  Goal: Patient's chronic conditions and co-morbidity symptoms are monitored and maintained or improved  Outcome: Progressing     Problem: Chronic Conditions and Co-morbidities  Goal: Patient's chronic conditions and co-morbidity symptoms are monitored and maintained or improved  Outcome: Progressing     Problem: Discharge Planning  Goal: Discharge to home or other facility with appropriate resources  Outcome: Progressing     Problem: Discharge Planning  Goal: Discharge to home or other facility with appropriate resources  Outcome: Progressing     Problem: Pain  Goal: Verbalizes/displays adequate comfort level or baseline comfort level  Outcome: Progressing     Problem: Pain  Goal: Verbalizes/displays adequate comfort level or baseline comfort level  Outcome: Progressing     Problem: Skin/Tissue Integrity  Goal: Skin integrity remains intact  Description: 1.  Monitor for areas of redness and/or skin breakdown  2.  Assess vascular access sites hourly  3.  Every 4-6 hours minimum:  Change oxygen saturation probe site  4.  Every 4-6 hours:  If on nasal continuous positive airway pressure, respiratory therapy assess nares and determine need for appliance change or resting period  Outcome: Progressing     Problem: Skin/Tissue Integrity  Goal: Skin integrity remains intact  Description: 1.  Monitor for areas of redness and/or skin breakdown  2.  Assess vascular access sites hourly  3.  Every 4-6 hours minimum:  Change oxygen saturation probe site  4.  Every 4-6 hours:  If on nasal continuous positive airway pressure, respiratory therapy assess nares and determine need for appliance change or resting period  Outcome: Progressing     Problem: Neurosensory - Adult  Goal: Achieves stable or improved neurological 
moving, reports brakes do work on his wheelchair, sleeps on recliner   ,  ,  ,  ,  ,  ,  ,  ,  ,  , Active : No     HOME SUPPORT: Patient lived alone and has friends and family members to assist at times.    Occupational Therapy Goals:  Initiated 7/26/2025  1.  Patient will perform grooming seated with good balance and modified independence once items provided within 7 day(s).  2.  Patient will perform upper body dressing with Set-up within 7 day(s).  3.  Patient will perform lower body dressing with Set-up within 7 day(s).  4.  Patient will perform toilet or bedside commode transfers with Contact Guard Assist  within 7 day(s).  5.  Patient will perform all aspects of toileting with Contact Guard Assist within 7 day(s).        7/26/2025 1523 by Ivory Barfield, OTR/L  Outcome: Progressing     
refocusing and direction  4. Decrease environmental stimuli, including noise as appropriate  5. Monitor and intervene to maintain adequate nutrition, hydration, elimination, sleep and activity  6. If unable to ensure safety without constant attention obtain sitter and review sitter guidelines with assigned personnel  7. Initiate Psychosocial CNS and Spiritual Care consult, as indicated  7/29/2025 2144 by Sally Malik RN  Outcome: Progressing  7/29/2025 0814 by Юлия Whaley, RN  Outcome: Progressing     Problem: Occupational Therapy - Adult  Goal: By Discharge: Performs self-care activities at highest level of function for planned discharge setting.  See evaluation for individualized goals.  Description: FUNCTIONAL STATUS PRIOR TO ADMISSION:  rarely uses prosthetic due to pain from ill fit, was using prosthesis to manage steps in and out of home but only went out for appointments, sits on porch in his wheelchair, wheelchair doesn't fit into bathroom and pt uses axillary crutches to get into bathroom and on steps, was cooking light breakfast only and warms meals otherwise, relies on friends and family for transportation and groceries, sits on shower chair to bathe and reports he transfers from toilet directly to tub (tight bathroom), to stand pt brings his residual limb into weight bearing on the end of his stump to achieve standing, multiple falls with wheelchair moving or chair moving, reports brakes do work on his wheelchair, sleeps on recliner   ,  ,  ,  ,  ,  ,  ,  ,  ,  , Active : No     HOME SUPPORT: Patient lived alone and has friends and family members to assist at times.    Occupational Therapy Goals:  Initiated 7/26/2025  1.  Patient will perform grooming seated with good balance and modified independence once items provided within 7 day(s).  2.  Patient will perform upper body dressing with Set-up within 7 day(s).  3.  Patient will perform lower body dressing with Set-up within 7 day(s).  4.  
activity  6. If unable to ensure safety without constant attention obtain sitter and review sitter guidelines with assigned personnel  7. Initiate Psychosocial CNS and Spiritual Care consult, as indicated  7/24/2025 0919 by Nette Bonilla, RN  Outcome: Progressing  Flowsheets (Taken 7/24/2025 0815)  Effect of thought disturbance (confusion, delirium, dementia, or psychosis) are managed with adequate functional status: Assess for contributors to thought disturbance, including medications, impaired vision or hearing, underlying metabolic abnormalities, dehydration, psychiatric diagnoses, notify LIP  7/23/2025 9780 by Anastacio Navas, RN  Outcome: Progressing     
contacts to provide reality reorientation, refocusing and direction  4. Decrease environmental stimuli, including noise as appropriate  5. Monitor and intervene to maintain adequate nutrition, hydration, elimination, sleep and activity  6. If unable to ensure safety without constant attention obtain sitter and review sitter guidelines with assigned personnel  7. Initiate Psychosocial CNS and Spiritual Care consult, as indicated  7/23/2025 2312 by Anastacio Navas, RN  Outcome: Progressing  7/23/2025 1848 by Rachel Mata, RN  Outcome: Progressing  7/23/2025 1328 by Lisa Clark, RN  Outcome: Progressing  Flowsheets (Taken 7/23/2025 0745 by Jessica Alicea, RN)  Effect of thought disturbance (confusion, delirium, dementia, or psychosis) are managed with adequate functional status:   Assess for contributors to thought disturbance, including medications, impaired vision or hearing, underlying metabolic abnormalities, dehydration, psychiatric diagnoses, notify LIP   Provide frequent short contacts to provide reality reorientation, refocusing and direction

## 2025-07-30 NOTE — PROGRESS NOTES
Hospitalist Progress Note    NAME:   Alexandro Ortiz   : 1950   MRN: [id number]     Date/Time: 2025 12:22 PM  Patient PCP: Alexandro Vail MD    Estimated discharge date:  Barriers:     Hospital course:25 - EMS found patient at home lethargic, covered with feces so brought into emergency department.  Last seen normal on Saturday by family. Patient was agitated/combative on arrival, blood glucose more than 700 and bicarb of 15.  CT head unremarkable. Patient given IV Haldol, followed by IV Versed x 2 without control of agitation and started on Precedex drip.  Patient has received 1 L of normal saline and starting DKA protocol. Patient has empirically been given IV Zosyn, blood culture sent.  Chest x-ray and UA are negative for infectious etiology.  ICU service was consulted for evaluation for ICU.  At time of ICU  evaluation, patient appeared ill and lethargic.  He does not answer questions.   tachycardic in 120s, regular rhythm, hypertensive in 170s to 180s systolic.  He appears cachectic, chronically ill and not respiratory distress.  Satting more than 95% on 2 L nasal cannula.  Patient was admitted in intensive care unit, started on DKA protocol and continued on empiric Zosyn.     25-anion gap has resolved, blood glucose less than 200 still on insulin drip.  Creatinine has normalized.  Afebrile, mild leukocytosis.  Blood cultures negative.  He is still on Precedex at 0.2 mics, calm.  He is alert, awake and oriented x 3.  He is telling me that taking insulin is too much for him so he stopped taking his insulin as well as his other medications at home.  He does not remember what happened and why he is in the hospital.  He does tell me that he previously has had episodes of diabetic ketoacidosis.    Assessment / Plan:  DKA, unclear precipitating factor.  Negative UA and chest x-ray for infectious etiology.  - History of diabetes, receives care at [facility].  Unclear if patient 
      Hospitalist Progress Note    NAME:   Alexandro Ortiz   : 1950   MRN: [id number]     Date/Time: 2025 3:16 PM  Patient PCP: Alexandro Vail MD    Estimated discharge date:   Barriers:    Hospital course:25 - EMS found patient at home lethargic, covered with feces so brought into emergency department.  Last seen normal on Saturday by family. Patient was agitated/combative on arrival, blood glucose more than 700 and bicarb of 15.  CT head unremarkable. Patient given IV Haldol, followed by IV Versed x 2 without control of agitation and started on Precedex drip.  Patient has received 1 L of normal saline and starting DKA protocol. Patient has empirically been given IV Zosyn, blood culture sent.  Chest x-ray and UA are negative for infectious etiology.  ICU service was consulted for evaluation for ICU.  At time of ICU  evaluation, patient appeared ill and lethargic.  He does not answer questions.   tachycardic in 120s, regular rhythm, hypertensive in 170s to 180s systolic.  He appears cachectic, chronically ill and not respiratory distress.  Satting more than 95% on 2 L nasal cannula.  Patient was admitted in intensive care unit, started on DKA protocol and continued on empiric Zosyn.     25-anion gap has resolved, blood glucose less than 200 still on insulin drip.  Creatinine has normalized.  Afebrile, mild leukocytosis.  Blood cultures negative.  He is still on Precedex at 0.2 mics, calm.  He is alert, awake and oriented x 3.  He is telling me that taking insulin is too much for him so he stopped taking his insulin as well as his other medications at home.  He does not remember what happened and why he is in the hospital.  He does tell me that he previously has had episodes of diabetic ketoacidosis.    Assessment / Plan:    DKA  Diabetes mellitus uncontrolled  - History of diabetes, receives care at [facility].  Unclear if patient has been compliant to home insulin regimen.  - S/p 
      Hospitalist Progress Note    NAME:   Alexandro Ortiz   : 1950   MRN: [id number]     Date/Time: 2025 4:35 PM  Patient PCP: Alexandro Vail MD    Estimated discharge date:  Barriers:     Hospital course:25 - EMS found patient at home lethargic, covered with feces so brought into emergency department.  Last seen normal on Saturday by family. Patient was agitated/combative on arrival, blood glucose more than 700 and bicarb of 15.  CT head unremarkable. Patient given IV Haldol, followed by IV Versed x 2 without control of agitation and started on Precedex drip.  Patient has received 1 L of normal saline and starting DKA protocol. Patient has empirically been given IV Zosyn, blood culture sent.  Chest x-ray and UA are negative for infectious etiology.  ICU service was consulted for evaluation for ICU.  At time of ICU  evaluation, patient appeared ill and lethargic.  He does not answer questions.   tachycardic in 120s, regular rhythm, hypertensive in 170s to 180s systolic.  He appears cachectic, chronically ill and not respiratory distress.  Satting more than 95% on 2 L nasal cannula.  Patient was admitted in intensive care unit, started on DKA protocol and continued on empiric Zosyn.     25-anion gap has resolved, blood glucose less than 200 still on insulin drip.  Creatinine has normalized.  Afebrile, mild leukocytosis.  Blood cultures negative.  He is still on Precedex at 0.2 mics, calm.  He is alert, awake and oriented x 3.  He is telling me that taking insulin is too much for him so he stopped taking his insulin as well as his other medications at home.  He does not remember what happened and why he is in the hospital.  He does tell me that he previously has had episodes of diabetic ketoacidosis.    Assessment / Plan:  DKA, unclear precipitating factor.  Negative UA and chest x-ray for infectious etiology.  - History of diabetes, receives care at [facility].  Unclear if patient 
      Hospitalist Progress Note    NAME:   Alexandro Ortiz   : 1950   MRN: [id number]     Date/Time: 2025 3:46 PM  Patient PCP: Alexandro Vail MD    Estimated discharge date:  Barriers:     Hospital course:25 - EMS found patient at home lethargic, covered with feces so brought into emergency department.  Last seen normal on Saturday by family. Patient was agitated/combative on arrival, blood glucose more than 700 and bicarb of 15.  CT head unremarkable. Patient given IV Haldol, followed by IV Versed x 2 without control of agitation and started on Precedex drip.  Patient has received 1 L of normal saline and starting DKA protocol. Patient has empirically been given IV Zosyn, blood culture sent.  Chest x-ray and UA are negative for infectious etiology.  ICU service was consulted for evaluation for ICU.  At time of ICU  evaluation, patient appeared ill and lethargic.  He does not answer questions.   tachycardic in 120s, regular rhythm, hypertensive in 170s to 180s systolic.  He appears cachectic, chronically ill and not respiratory distress.  Satting more than 95% on 2 L nasal cannula.  Patient was admitted in intensive care unit, started on DKA protocol and continued on empiric Zosyn.     25-anion gap has resolved, blood glucose less than 200 still on insulin drip.  Creatinine has normalized.  Afebrile, mild leukocytosis.  Blood cultures negative.  He is still on Precedex at 0.2 mics, calm.  He is alert, awake and oriented x 3.  He is telling me that taking insulin is too much for him so he stopped taking his insulin as well as his other medications at home.  He does not remember what happened and why he is in the hospital.  He does tell me that he previously has had episodes of diabetic ketoacidosis.    Assessment / Plan:  DKA, unclear precipitating factor.  Negative UA and chest x-ray for infectious etiology.  - History of diabetes, receives care at [facility].  Unclear if patient 
      Hospitalist Progress Note    NAME:   Alexandro Ortiz   : 1950   MRN: [id number]     Date/Time: 2025 2:34 PM  Patient PCP: Alexandro Vail MD    Estimated discharge date:   Barriers:    Hospital course:25 - EMS found patient at home lethargic, covered with feces so brought into emergency department.  Last seen normal on Saturday by family. Patient was agitated/combative on arrival, blood glucose more than 700 and bicarb of 15.  CT head unremarkable. Patient given IV Haldol, followed by IV Versed x 2 without control of agitation and started on Precedex drip.  Patient has received 1 L of normal saline and starting DKA protocol. Patient has empirically been given IV Zosyn, blood culture sent.  Chest x-ray and UA are negative for infectious etiology.  ICU service was consulted for evaluation for ICU.  At time of ICU  evaluation, patient appeared ill and lethargic.  He does not answer questions.   tachycardic in 120s, regular rhythm, hypertensive in 170s to 180s systolic.  He appears cachectic, chronically ill and not respiratory distress.  Satting more than 95% on 2 L nasal cannula.  Patient was admitted in intensive care unit, started on DKA protocol and continued on empiric Zosyn.     25-anion gap has resolved, blood glucose less than 200 still on insulin drip.  Creatinine has normalized.  Afebrile, mild leukocytosis.  Blood cultures negative.  He is still on Precedex at 0.2 mics, calm.  He is alert, awake and oriented x 3.  He is telling me that taking insulin is too much for him so he stopped taking his insulin as well as his other medications at home.  He does not remember what happened and why he is in the hospital.  He does tell me that he previously has had episodes of diabetic ketoacidosis.    Assessment / Plan:    DKA  - History of diabetes, receives care at [facility].  Unclear if patient has been compliant to home insulin regimen.  - S/p insulin drip as per DKA protocol 
      Hospitalist Progress Note    NAME:   Alexandro Ortiz   : 1950   MRN: [id number]     Date/Time: 2025 1:27 PM  Patient PCP: Alexandro Vail MD    Estimated discharge date:  Barriers:     Hospital course:25 - EMS found patient at home lethargic, covered with feces so brought into emergency department.  Last seen normal on Saturday by family. Patient was agitated/combative on arrival, blood glucose more than 700 and bicarb of 15.  CT head unremarkable. Patient given IV Haldol, followed by IV Versed x 2 without control of agitation and started on Precedex drip.  Patient has received 1 L of normal saline and starting DKA protocol. Patient has empirically been given IV Zosyn, blood culture sent.  Chest x-ray and UA are negative for infectious etiology.  ICU service was consulted for evaluation for ICU.  At time of ICU  evaluation, patient appeared ill and lethargic.  He does not answer questions.   tachycardic in 120s, regular rhythm, hypertensive in 170s to 180s systolic.  He appears cachectic, chronically ill and not respiratory distress.  Satting more than 95% on 2 L nasal cannula.  Patient was admitted in intensive care unit, started on DKA protocol and continued on empiric Zosyn.     25-anion gap has resolved, blood glucose less than 200 still on insulin drip.  Creatinine has normalized.  Afebrile, mild leukocytosis.  Blood cultures negative.  He is still on Precedex at 0.2 mics, calm.  He is alert, awake and oriented x 3.  He is telling me that taking insulin is too much for him so he stopped taking his insulin as well as his other medications at home.  He does not remember what happened and why he is in the hospital.  He does tell me that he previously has had episodes of diabetic ketoacidosis.    Assessment / Plan:  DKA, unclear precipitating factor.  Negative UA and chest x-ray for infectious etiology.  - History of diabetes, receives care at [facility].  Unclear if patient 
      Hospitalist Progress Note    NAME:   Alexandro Ortiz   : 1950   MRN: [id number]     Date/Time: 2025 3:30 PM  Patient PCP: Alexandro Vail MD    Estimated discharge date: 2 days  Barriers: ID recommendations, neurology consult, placement  Blood sugar control    Hospital course:25 - EMS found patient at home lethargic, covered with feces so brought into emergency department.  Last seen normal on Saturday by family. Patient was agitated/combative on arrival, blood glucose more than 700 and bicarb of 15.  CT head unremarkable. Patient given IV Haldol, followed by IV Versed x 2 without control of agitation and started on Precedex drip.  Patient has received 1 L of normal saline and starting DKA protocol. Patient has empirically been given IV Zosyn, blood culture sent.  Chest x-ray and UA are negative for infectious etiology.  ICU service was consulted for evaluation for ICU.  At time of ICU  evaluation, patient appeared ill and lethargic.  He does not answer questions.   tachycardic in 120s, regular rhythm, hypertensive in 170s to 180s systolic.  He appears cachectic, chronically ill and not respiratory distress.  Satting more than 95% on 2 L nasal cannula.  Patient was admitted in intensive care unit, started on DKA protocol and continued on empiric Zosyn.     25-anion gap has resolved, blood glucose less than 200 still on insulin drip.  Creatinine has normalized.  Afebrile, mild leukocytosis.  Blood cultures negative.  He is still on Precedex at 0.2 mics, calm.  He is alert, awake and oriented x 3.  He is telling me that taking insulin is too much for him so he stopped taking his insulin as well as his other medications at home.  He does not remember what happened and why he is in the hospital.  He does tell me that he previously has had episodes of diabetic ketoacidosis.    Assessment / Plan:  DKA, unclear precipitating factor.  Negative UA and chest x-ray for infectious 
     [facility]   PROGRESS NOTE      Name: Alexandro Ortiz   : 1950   MRN: [id number]   Date: 2025      Chief Complaint   Patient presents with    Altered Mental Status       Reason for ICU:     Acute encephalopathy, agitated state  DKA    HPI & Hospital course     No prior records available in the chart, patient receives his care at [facility].  25 - EMS found patient at home lethargic, covered with feces so brought into emergency department.  Last seen normal on Saturday by family. Patient was agitated/combative on arrival, blood glucose more than 700 and bicarb of 15.  CT head unremarkable. Patient given IV Haldol, followed by IV Versed x 2 without control of agitation and started on Precedex drip.  Patient has received 1 L of normal saline and starting DKA protocol. Patient has empirically been given IV Zosyn, blood culture sent.  Chest x-ray and UA are negative for infectious etiology.  ICU service was consulted for evaluation for ICU.  At time of my evaluation, patient appeared ill and lethargic.  He does not answer questions.  He is tachycardic in 120s, regular rhythm, hypertensive in 170s to 180s systolic.  He appears cachectic, chronically ill and not respiratory distress.  Satting more than 95% on 2 L nasal cannula.  Patient was admitted in intensive care unit, started on DKA protocol and continued on empiric Zosyn.    25-anion gap has resolved, blood glucose less than 200 still on insulin drip.  Creatinine has normalized.  Afebrile, mild leukocytosis.  Blood cultures negative.  He is still on Precedex at 0.2 mics, calm.  He is alert, awake and oriented x 3.  He is telling me that taking insulin is too much for him so he stopped taking his insulin as well as his other medications at home.  He does not remember what happened and why he is in the hospital.  He does tell me that he previously has had episodes of diabetic ketoacidosis.  Hypertensive 176/62.  Home p.o. meds 
0700: Bedside and Verbal shift change report given to Юлия Whaley RN (oncoming nurse) by CATERINA Schroeder (offgoing nurse). Report included the following information Nurse Handoff Report, Adult Overview, Intake/Output, MAR, and Recent Results.     1646: RN Letha Howard MD regarding pt BG of 443. Insulin administered per SSI order and scheduled dose. No further coverage ordered at this time.     1712: dynamic access at bedside placing Midline.     1900: Bedside and Verbal shift change report given to Jena Alexander RN (oncoming nurse) by Юлия Whaley RN (offgoing nurse). Report included the following information Nurse Handoff Report, Adult Overview, Intake/Output, MAR, and Recent Results.     
0730: bedside shift report given from Walter RN to CATERINA Lopez and CATERINA Gonzalez     0745: shift assessment completed, sitter at bedside, patient alert and oriented,      0758: patient is hypertensive, MD Yolande notified, new orders noted     0800: MD Yolande at bedside, advancing diet, stopping Precedex and titrating off insulin drip at this time     0854: 16 units of Lantus given per MD Yolande to transition off insulin drip     0900: rounds completed with team, discontinue insulin drip, start LR,     0944: patient eating breakfast, but only a few bites of banana, complaining of headache, Tylenol given     1057: insulin drip stopped     1100: spoke with MD Yolande about discontinuing sitter     1300: transferred patient to [facility], CATERINA Ramos  
0740   Pt is off from the floor for MRI.  
1220: Report received from Lisa DIGGS on pt. Being transferred to [facility] 2327.     1310: TRANSFER - IN REPORT:    Verbal report received from Lisa DIGGS on Alexandro Ortiz  being received from CCU for routine progression of patient care      Report consisted of patient's Situation, Background, Assessment and   Recommendations(SBAR).     Information from the following report(s) Nurse Handoff Report, Index, Intake/Output, MAR, Recent Results, and Cardiac Rhythm NSR was reviewed with the receiving nurse.    Opportunity for questions and clarification was provided.      Assessment completed upon patient's arrival to unit and care assumed. Pt. Placed on isoair bed at this time, daughter, Jono at bedside. Pt. Complaining of 10/10 headache that is \"throbbing\", pt. Received tylenol this morning which he states \"did not work\". Aden GRIMALDO messaged via [employer] with request for additional pain medication.       End of Shift Note    Bedside shift change report given to Anastacio DIGGS (oncoming nurse) by Rachel Mata RN (offgoing nurse).  Report included the following information SBAR, Kardex, Intake/Output, MAR, Recent Results, and Cardiac Rhythm NSR    Shift worked:  3088-3605     Shift summary and any significant changes:     Received pt. From CCU this afternoon, placed on isoair bed due to wounds and sari score.     Continuing IV fluids, IV abx. And management of BP and blood sugars. Transitioned off insulin drip prior to departure from CCU, please refer to their note.      Concerns for physician to address:  Pain- pt. Continues to request morphine for head     Zone phone for oncoming shift:   [phone]       Activity:  Level of Assistance: Maximum assist, patient does 25-49%  Number times ambulated in hallways past shift: 0  Number of times OOB to chair past shift: 0    Cardiac:   Cardiac Monitoring: Yes      Cardiac Rhythm: Sinus rhythm    Access:  Current line(s): PIV     Genitourinary:   Urinary Status: Voiding, External 
1730- Pt received from the ER. Currently on Precedex at 1 mcg/kg/hr. Insulin infusing at 10.8 units/hr. Drowsy but remains restless and not following commands. Attempting to pull off gown and IV's. Dr. Lanier at the bedside.     1750- Assessment complete with CATERINA Lopez. See Flowsheets for more details.     1852- Pt becoming less arousable/restless. BP 86/47. Precedex stopped at this time.     Shift report given to CATERINA Watson Report included the following information SBAR, Kardex, ED Summary, Procedure Summary, Intake/Output, MAR, and Recent Results    
1900: Bedside and Verbal shift change report given to Abigail Malik RN   (oncoming nurse) by CATERINA Redman (offgoing nurse). Report included the following information Nurse Handoff Report, Index, Adult Overview, MAR, Recent Results, and Cardiac Rhythm NSR.     End of Shift Note    Bedside shift change report given to CATERINA Estrada (oncoming nurse) by Abigail Malik RN (offgoing nurse).  Report included the following information SBAR, Kardex, Intake/Output, MAR, Recent Results, and Cardiac Rhythm NSR    Shift worked:  0190-5684     Shift summary and any significant changes:     Patient given CHG bath overnight. , PA messaged, no new orders. No acute events overnight. Patient to be discharged at 1030 to [facility].     Concerns for physician to address:  na     Zone phone for oncoming shift:   na       Activity:  Level of Assistance: Moderate assist, patient does 50-74%  Number times ambulated in hallways past shift: 0  Number of times OOB to chair past shift: 0    Cardiac:   Cardiac Monitoring: Yes      Cardiac Rhythm: Sinus rhythm    Access:  Current line(s): PIV  and midline    Genitourinary:   Urinary Status: Voiding, External catheter    Respiratory:   O2 Device: None (Room air)  Chronic home O2 use?: NO  Incentive spirometer at bedside: NO    GI:  Last BM (including prior to admit): 07/23/25  Current diet:  ADULT DIET; Regular; 4 carb choices (60 gm/meal); No Added Salt (3-4 gm)  Passing flatus: YES    Pain Management:   Patient states pain is manageable on current regimen: YES    Skin:  Patel Scale Score: 17  Interventions: Wound Offloading (Prevention Methods): Bed, pressure reduction mattress, Pillows, Repositioning, Turning    Patient Safety:  Fall Risk: Nursing Judgement-Fall Risk High(Add Comments): Yes  Fall Risk Interventions  Nursing Judgement-Fall Risk High(Add Comments): Yes  Toilet Every 2 Hours-In Advance of Need: Yes  Hourly Visual Checks: Awake, In bed  Fall Visual 
2119   Neurology consult to dr martinez via [employer].  
2245 Bedside and Verbal shift change report given to CATERINA Estrada (oncoming nurse) by Catherine Bella RN (offgoing nurse). Report included the following information Nurse Handoff Report.     1008 Report called to CATERINA Marvin at [facility].    1110 Verified IV abx at SNF per Dr. Enamorado with CM.    1137 Updated dc AVS faxed successfully to [facility].  
Attempted to see pt for OT services.  Pt is out of the room for MRI.  Will defer, but continue to follow.   
Bedside and Verbal shift change report given to Nette RN (oncoming nurse) by Anastacio RN (offgoing nurse). Report included the following information Nurse Handoff Report, Adult Overview, Intake/Output, MAR, Recent Results, and Cardiac Rhythm NSR.    
Critical care interdisciplinary rounds today.  Following members present: Case Management, , Clinical Lead, Diabetes Team, Nursing, Nutrition, Pharmacy, and Physician. Family invited to participate.  Plan of care discussed.  See clinical pathway for plan of care and interventions and desired outcomes.   
End of Shift Note    Bedside shift change report given to CATERINA Jones (oncoming nurse) by Penelope Menchaca RN (offgoing nurse).  Report included the following information SBAR, Kardex, Intake/Output, MAR, and Recent Results    Shift worked:  4593-2502     Shift summary and any significant changes:     No acute changes. No concerns overnight. Head CT completed. Pt still reporting severe pain in head and neck, exacerbated with activity.      Concerns for physician to address:  Pain source/control     Zone phone for oncoming shift:          Activity:  Level of Assistance: Maximum assist, patient does 25-49%  Number times ambulated in hallways past shift: 0  Number of times OOB to chair past shift: 0    Cardiac:   Cardiac Monitoring: Yes      Cardiac Rhythm: Sinus rhythm    Access:  Current line(s): PIV     Genitourinary:   Urinary Status: Voiding, External catheter, Incontinent    Respiratory:   O2 Device: None (Room air)  Chronic home O2 use?: NO  Incentive spirometer at bedside: YES    GI:  Last BM (including prior to admit): 07/23/25  Current diet:  ADULT DIET; Regular; 4 carb choices (60 gm/meal); No Added Salt (3-4 gm)  Passing flatus: YES    Pain Management:   Patient states pain is manageable on current regimen: YES    Skin:  Patel Scale Score: 14  Interventions: Wound Offloading (Prevention Methods): Bed, pressure redistribution/air, Elevate heels, Pillows, Repositioning, Turning    Patient Safety:  Fall Risk: Nursing Judgement-Fall Risk High(Add Comments): Yes  Fall Risk Interventions  Nursing Judgement-Fall Risk High(Add Comments): Yes  Toilet Every 2 Hours-In Advance of Need: Yes  Hourly Visual Checks: Awake, In bed, Quiet  Fall Visual Posted: Armband, Fall sign posted, Socks  Room Door Open: Deferred to promote rest  Alarm On: Bed  Patient Moved Closer to Nursing Station: Yes    Active Consults:   IP CONSULT TO PHARMACY  IP CONSULT TO INFECTIOUS DISEASES  IP CONSULT TO DIABETES MANAGEMENT  IP CONSULT TO 
End of Shift Note    Bedside shift change report given to CATERINA Negrete (oncoming nurse) by Hilario Pak RN (offgoing nurse).  Report included the following information SBAR, Kardex, Intake/Output, and MAR    Shift worked:  7a-7p     Shift summary and any significant changes:     Pt is alert and orientation during my shift. Pain med oxycodone given x2 MRI done. Wound care done. NO BM. Pt still have complained headache ,dizziness and neck pain when move. PT/OT work today.     Concerns for physician to address:       Zone phone for oncoming shift:   [phone]       Activity:  Level of Assistance: Maximum assist, patient does 25-49%  Number times ambulated in hallways past shift: 0  Number of times OOB to chair past shift: 0    Cardiac:   Cardiac Monitoring: Yes      Cardiac Rhythm: Sinus rhythm    Access:  Current line(s): PIV     Genitourinary:   Urinary Status: Voiding, External catheter    Respiratory:   O2 Device: None (Room air)  Chronic home O2 use?: NO  Incentive spirometer at bedside: YES    GI:  Last BM (including prior to admit): 07/23/25  Current diet:  ADULT DIET; Regular; 4 carb choices (60 gm/meal); No Added Salt (3-4 gm)  Passing flatus: YES    Pain Management:   Patient states pain is manageable on current regimen: YES    Skin:  Patel Scale Score: 14  Interventions: Wound Offloading (Prevention Methods): Special mattress, Repositioning, Pillows, Heel boots    Patient Safety:  Fall Risk: Nursing Judgement-Fall Risk High(Add Comments): Yes  Fall Risk Interventions  Nursing Judgement-Fall Risk High(Add Comments): Yes  Toilet Every 2 Hours-In Advance of Need: Yes  Hourly Visual Checks: Awake, In bed  Fall Visual Posted: Socks  Room Door Open: Deferred to decrease stimulation  Alarm On: Bed  Patient Moved Closer to Nursing Station: Yes    Active Consults:   IP CONSULT TO PHARMACY  IP CONSULT TO INFECTIOUS DISEASES    Length of Stay:  Expected LOS: 6  Actual LOS: 4    Hilario Pak RN                         
End of Shift Note    Bedside shift change report given to CATERINA Negrete (oncoming nurse) by Hilario Pak RN (offgoing nurse).  Report included the following information SBAR, Kardex, Intake/Output, and MAR    Shift worked:  7a-7p     Shift summary and any significant changes:     Pt is alert and orientation. Pain med given oxycodone x2 . Neurology consulted. Pt is voiding by ext catheter. Q2 turn repositioning.     Concerns for physician to address:       Zone phone for oncoming shift:   [phone]       Activity:  Level of Assistance: Maximum assist, patient does 25-49%  Number times ambulated in hallways past shift: 0  Number of times OOB to chair past shift: 0    Cardiac:   Cardiac Monitoring: Yes      Cardiac Rhythm: Sinus rhythm    Access:  Current line(s): PIV     Genitourinary:   Urinary Status: Voiding, External catheter    Respiratory:   O2 Device: None (Room air)  Chronic home O2 use?: NO  Incentive spirometer at bedside: YES    GI:  Last BM (including prior to admit): 07/23/25  Current diet:  ADULT DIET; Regular; 4 carb choices (60 gm/meal); No Added Salt (3-4 gm)  Passing flatus: YES    Pain Management:   Patient states pain is manageable on current regimen: YES    Skin:  Patel Scale Score: 13  Interventions: Wound Offloading (Prevention Methods): Bed, pressure reduction mattress    Patient Safety:  Fall Risk: Nursing Judgement-Fall Risk High(Add Comments): Yes  Fall Risk Interventions  Nursing Judgement-Fall Risk High(Add Comments): Yes  Toilet Every 2 Hours-In Advance of Need: Yes  Hourly Visual Checks: Awake, In bed, Quiet  Fall Visual Posted: Armband, Fall sign posted, Socks  Room Door Open: Deferred to promote rest  Alarm On: Bed  Patient Moved Closer to Nursing Station: Yes    Active Consults:   IP CONSULT TO PHARMACY  IP CONSULT TO INFECTIOUS DISEASES  IP CONSULT TO DIABETES MANAGEMENT  IP CONSULT TO NEUROLOGY    Length of Stay:  Expected LOS: 6  Actual LOS: 5    Hilario Pak 
End of Shift Note    Bedside shift change report given to CATERINA Redman (oncoming nurse) by Elda Monge RN (offgoing nurse).  Report included the following information SBAR, Kardex, Recent Results, and Cardiac Rhythm SR    Shift worked:    Shift summary and any significant changes:       Concerns for physician to address:    Zone phone for oncoming shift:       Activity:  Level of Assistance: Moderate assist, patient does 50-74%  Number times ambulated in hallways past shift: 0  Number of times OOB to chair past shift: 0    Cardiac:   Cardiac Monitoring: Yes      Cardiac Rhythm: Sinus rhythm    Access:  Current line(s): PIV    Genitourinary:   Urinary Status: External catheter, Voiding    Respiratory:   O2 Device: None (Room air)  Chronic home O2 use?: NO  Incentive spirometer at bedside: YES    GI:  Last BM (including prior to admit): 07/23/25  Current diet:  ADULT DIET; Regular; 4 carb choices (60 gm/meal); No Added Salt (3-4 gm)  Passing flatus: YES    Pain Management:   Patient states pain is manageable on current regimen: YES    Skin:  Patel Scale Score: 17  Interventions: Wound Offloading (Prevention Methods): Bed, pressure redistribution/air    Patient Safety:  Fall Risk: Nursing Judgement-Fall Risk High(Add Comments): Yes  Fall Risk Interventions  Nursing Judgement-Fall Risk High(Add Comments): Yes  Toilet Every 2 Hours-In Advance of Need: Yes  Hourly Visual Checks: Awake, Quiet, In bed  Fall Visual Posted: Armband, Socks  Room Door Open: Yes  Alarm On: Bed  Patient Moved Closer to Nursing Station: No    Active Consults:   IP CONSULT TO PHARMACY  IP CONSULT TO INFECTIOUS DISEASES  IP CONSULT TO DIABETES MANAGEMENT  IP CONSULT TO NEUROLOGY    Length of Stay:  Expected LOS: 7  Actual LOS: 7    Elda Monge RN     
End of Shift Note    Bedside shift change report given to CATERINA Rich (oncoming nurse) by Penelope Menchaca RN (offgoing nurse).  Report included the following information SBAR, Kardex, Intake/Output, MAR, and Recent Results    Shift worked:  0578-2226     Shift summary and any significant changes:     No acute changes. Glucose elevated overnight, endocrinology consult to be called per overnight provider. No concerns overnight. Pt awaiting discharge.      Concerns for physician to address:  none     Zone phone for oncoming shift:          Activity:  Level of Assistance: Maximum assist, patient does 25-49%  Number times ambulated in hallways past shift: 0  Number of times OOB to chair past shift: 0    Cardiac:   Cardiac Monitoring: Yes      Cardiac Rhythm: Sinus rhythm    Access:  Current line(s): PIV     Genitourinary:   Urinary Status: Voiding, External catheter, Incontinent    Respiratory:   O2 Device: None (Room air)  Chronic home O2 use?: NO  Incentive spirometer at bedside: YES    GI:  Last BM (including prior to admit): 07/23/25  Current diet:  ADULT DIET; Regular; 4 carb choices (60 gm/meal); No Added Salt (3-4 gm)  Passing flatus: YES    Pain Management:   Patient states pain is manageable on current regimen: YES    Skin:  Patel Scale Score: 13  Interventions: Wound Offloading (Prevention Methods): Bed, pressure redistribution/air, Elevate heels, Pillows, Repositioning, Turning    Patient Safety:  Fall Risk: Nursing Judgement-Fall Risk High(Add Comments): Yes  Fall Risk Interventions  Nursing Judgement-Fall Risk High(Add Comments): Yes  Toilet Every 2 Hours-In Advance of Need: Yes  Hourly Visual Checks: Awake, In bed, Quiet  Fall Visual Posted: Armband, Fall sign posted, Socks  Room Door Open: Deferred to promote rest  Alarm On: Bed  Patient Moved Closer to Nursing Station: Yes    Active Consults:   IP CONSULT TO PHARMACY  IP CONSULT TO INFECTIOUS DISEASES  IP CONSULT TO ENDOCRINOLOGY    Length of 
End of Shift Note    Bedside shift change report given to Hilario (oncoming nurse) by Юлия Chen RN (offgoing nurse).  Report included the following information SBAR, MAR, Recent Results, and Cardiac Rhythm NSR    Shift worked:  1900 0730     Shift summary and any significant changes:     VSS, Uneventful shift      Concerns for physician to address:  Pain control      Zone phone for oncoming shift:   [phone]       Activity:  Level of Assistance: Maximum assist, patient does 25-49%  Number times ambulated in hallways past shift: 0  Number of times OOB to chair past shift: 0    Cardiac:   Cardiac Monitoring: Yes      Cardiac Rhythm: Sinus rhythm    Access:  Current line(s): PIV    Genitourinary:   Urinary Status: Voiding, External catheter    Respiratory:   O2 Device: None (Room air)  Chronic home O2 use?: NO  Incentive spirometer at bedside: NO    GI:  Last BM (including prior to admit): 07/23/25  Current diet:  ADULT DIET; Regular; 4 carb choices (60 gm/meal); No Added Salt (3-4 gm)  Passing flatus: YES    Pain Management:   Patient states pain is manageable on current regimen: NO    Skin:  Patel Scale Score: 14  Interventions: Wound Offloading (Prevention Methods): Special mattress    Patient Safety:  Fall Risk: Nursing Judgement-Fall Risk High(Add Comments): Yes  Fall Risk Interventions  Nursing Judgement-Fall Risk High(Add Comments): Yes  Toilet Every 2 Hours-In Advance of Need: Yes  Hourly Visual Checks: Awake, In bed  Fall Visual Posted: Socks  Room Door Open: Deferred to decrease stimulation  Alarm On: Bed  Patient Moved Closer to Nursing Station: Yes    Active Consults:   IP CONSULT TO PHARMACY  IP CONSULT TO INFECTIOUS DISEASES    Length of Stay:  Expected LOS: 6  Actual LOS: 4    Юлия Chen RN     
End of Shift Note    Bedside shift change report given to nurse Elda (oncoming nurse) by Karen Scott RN (offgoing nurse).  Report included the following information SBAR, Intake/Output, MAR, Recent Results, and Cardiac Rhythm nsr    Shift worked:  7A-7P     Shift summary and any significant changes:     1349:  A&O X4, able to make his needs known. Vss. WNL, no sign of distress noted at this time. Oxy and Fioricet  administered as ordered, patient still reports pain; ND notified. Patient up in chair, tolerates well. Safety precautions in place, we'll continue to monitor.     Concerns for physician to address:       Zone phone for oncoming shift:          Activity:  Level of Assistance: Maximum assist, patient does 25-49%  Number times ambulated in hallways past shift: 0  Number of times OOB to chair past shift: 1    Cardiac:   Cardiac Monitoring: Yes      Cardiac Rhythm: Sinus rhythm    Access:  Current line(s): PIV     Genitourinary:   Urinary Status: Voiding, External catheter    Respiratory:   O2 Device: None (Room air)  Chronic home O2 use?: NO  Incentive spirometer at bedside: YES    GI:  Last BM (including prior to admit): 07/23/25  Current diet:  ADULT DIET; Regular; 4 carb choices (60 gm/meal); No Added Salt (3-4 gm)  Passing flatus: YES    Pain Management:   Patient states pain is manageable on current regimen: YES    Skin:  Patel Scale Score: 14  Interventions: Wound Offloading (Prevention Methods): Bed, pressure redistribution/air, Elevate heels, Pillows, Repositioning, Turning    Patient Safety:  Fall Risk: Nursing Judgement-Fall Risk High(Add Comments): Yes  Fall Risk Interventions  Nursing Judgement-Fall Risk High(Add Comments): Yes  Toilet Every 2 Hours-In Advance of Need: Yes  Hourly Visual Checks: Awake, Quiet, In bed  Fall Visual Posted: Armband, Socks  Room Door Open: Yes  Alarm On: Bed, Chair  Patient Moved Closer to Nursing Station: Yes    Active Consults:   IP CONSULT TO PHARMACY  IP 
End of Shift Note    Bedside shift change report given to Юлия DIGGS (oncoming nurse) by Nette Bonilla, RN (offgoing nurse).  Report included the following information SBAR, Intake/Output, MAR, Recent Results, and Cardiac Rhythm NSR    Shift worked:  7a-7p     Shift summary and any significant changes:     No significant changes      Concerns for physician to address:  None      Zone phone for oncoming shift:   [phone]       Activity:  Level of Assistance: Maximum assist, patient does 25-49%  Number times ambulated in hallways past shift: 0  Number of times OOB to chair past shift: 0    Cardiac:   Cardiac Monitoring: Yes      Cardiac Rhythm: Sinus rhythm    Access:  Current line(s): PIV     Genitourinary:   Urinary Status: Voiding, External catheter    Respiratory:   O2 Device: None (Room air)  Chronic home O2 use?: NO  Incentive spirometer at bedside: NO    GI:  Last BM (including prior to admit): 07/23/25  Current diet:  ADULT DIET; Regular; 4 carb choices (60 gm/meal); No Added Salt (3-4 gm)  Passing flatus: YES    Pain Management:   Patient states pain is manageable on current regimen: YES    Skin:  Patel Scale Score: 14  Interventions: Wound Offloading (Prevention Methods): Bed, pressure redistribution/air, Pillows, Repositioning    Patient Safety:  Fall Risk: Nursing Judgement-Fall Risk High(Add Comments): Yes  Fall Risk Interventions  Nursing Judgement-Fall Risk High(Add Comments): Yes  Toilet Every 2 Hours-In Advance of Need: Yes  Hourly Visual Checks: Awake, In bed  Fall Visual Posted: Armband, Fall sign posted  Room Door Open: Deferred to promote rest  Alarm On: Bed  Patient Moved Closer to Nursing Station: No    Active Consults:   IP CONSULT TO PHARMACY  IP CONSULT TO INFECTIOUS DISEASES    Length of Stay:  Expected LOS: 6  Actual LOS: 2    Nette Bonilla, RN                           
Gap closed x3 with last one at 4. Co2 24. PT is now alert and able to safely take PO. Left upper arm infiltrated with zosyn. Per pharmacy, IV aspirated and removed. Arm elevated and warm/cold compress applied. No reversal agent required.   Current Drips   Insulin @ 2.4   D5 1/2 NS @ 150   Precedex @ .2        
I called patient's daughter to update regarding plan of care but did not  so left a message.  
MRI ON HOLD - SCREENING SHEET    MRI screening must be completed and signed before patient is considered eligible for MRI.    Please complete and sign electronically or fax to [phone].    Please call [phone] when complete.    Thank You  
MRI PENDING    Completion of MRI Screening Sheet    Fax to [phone] when completed    Please call [phone]  When this is done    Thank You  
Occupational Therapy    Chart reviewed. Pt cleared by RN for therapy. Pt received OOB in chair, alert and oriented x4; c/o neck pain and headache 9/10. RN aware and reports MRI is ordered. Pt politely declined therapy due to pain; provided pt with warm compress for neck pain, RN notified. Will continue to follow.    Abbey Ortiz MS, OTR/L  
Occupational Therapy    OT referral received, chart reviewed, and attempted to see patient for evaluation. Patient politely declined participation in OT evaluation due to having a head ache, neck and shoulder pain, as well as nausea. He reports nursing has recently given him pain medication and nursing also reports that they have given the patient muscle relaxers. Will defer OT evaluation this afternoon per patient request and  follow up tomorrow.     Cliff Interiano, OTR/L    
Occupational Therapy  7/25/2025    Pt cleared for therapy by nursing and visited pt for therapy evaluation. Pt semi-fowlers in bed reporting headache pain of 9/10. Pt reports he cannot move head without getting dizzy and nauseous. Also reports hallucinations (going to open ice cream and it's not there or seeing a crack in the wall and thinking it's his brother). Pt declined therapy evaluation today. Nursing informed.     Will revisit for evaluation as able.     Thank you,   Kaylah Zhu, OTR/L     
Pharmacy Antimicrobial Kinetic Dosing    Indication for Antimicrobials: bacteremia     Current Regimen of Each Antimicrobial:  Vancomycin 2.5 g X1 then 750 mg q 12 h; Start Date ; Day # 1  Zosyn 3.375 g q 8 h (; day #1    Previous Antimicrobial Therapy:        Goal Level: Vancomycin -600    Date Dose & Interval Measured (mcg/mL) Predicted AUC 24-48 Predicted AUC 24,ss                          Significant Cultures:   : Blood cx: GPC in clusters 1/2 bottles    Labs:  Recent Labs     Units 25  0615 25  0303 25  2323 25  1352 25  1337   CREATININE MG/DL 1.25 1.12 1.45*   < > 1.94*   BUN MG/DL 37* 34* 39*   < > 40*   WBC K/uL  --  16.6*  --   --  17.9*    < > = values in this interval not displayed.     Temp (24hrs), Av.5 °F (36.4 °C), Min:96.3 °F (35.7 °C), Max:98.7 °F (37.1 °C)      Conditions for Dosing Consideration: none    Creatinine Clearance (mL/min): Estimated Creatinine Clearance: 63 mL/min (based on SCr of 1.25 mg/dL).       Impression/Plan:   Vancomycin 2.5 g X1 then 750 mg q 12 h  Predicted BWZ52-75 = 310, Predicted AUC24,ss = 472  Continue with zosyn  Antimicrobial stop date pending     Pharmacy will follow daily and adjust medications as appropriate for renal function and/or serum levels.    Thank you,  Bo Peña [facility]    
Pharmacy Medication History Review    Medication history obtained by Lucia Shen while patient was in room ER11/11 and was completed based on information available during current patient encounter. Medication history was completed before home meds were reconciled by provider.    Pharmacist Admission Medication Reconciliation Recommendations:            PTA medication list was corrected to the following:   Prior to Admission Medications   Prescriptions Last Dose Informant   DULoxetine (CYMBALTA) 60 MG extended release capsule Unknown Other   Sig: Take 1 capsule by mouth at bedtime   amLODIPine (NORVASC) 10 MG tablet Unknown Other   Sig: Take 1 tablet by mouth daily   atorvastatin (LIPITOR) 80 MG tablet Unknown Other   Sig: Take 1 tablet by mouth every evening   carvedilol (COREG) 25 MG tablet Unknown Other   Sig: Take 1 tablet by mouth 2 times daily (with meals)   lisinopril (PRINIVIL;ZESTRIL) 40 MG tablet Unknown Other   Sig: Take 1 tablet by mouth daily   metFORMIN (GLUCOPHAGE) 500 MG tablet Unknown Other   Sig: Take 2 tablets by mouth 2 times daily (with meals)   methocarbamol (ROBAXIN) 750 MG tablet Unknown Other   Sig: Take 1 tablet by mouth every 6 hours as needed (muscle pain)   ofloxacin (OCUFLOX) 0.3 % solution Unknown Other   Sig: Place 1 drop into the left eye 4 times daily   prednisoLONE acetate (PRED FORTE) 1 % ophthalmic suspension Unknown Other   Sig: Place 1 drop into the left eye daily   timolol (BETIMOL) 0.5 % ophthalmic solution Unknown Other   Sig: Place 1 drop into the left eye daily   vitamin B-12 (CYANOCOBALAMIN) 500 MCG tablet Unknown Other   Sig: Take 2 tablets by mouth daily   vitamin D (VITAMIN D3) 50 MCG (2000 UT) CAPS capsule Unknown Other   Sig: Take 1 capsule by mouth daily      Facility-Administered Medications: None         Pertinent Information:   [facility]  Unable to verify anything with patient at this time.  Patient lives alone and handles 
Pt c/o head and neck pain. PRN pain meds given with minimal relief. MD made aware. Hot packs placed to the back of the neck. Pt states that it helps with the pain. Pt also c/o dizziness. N.O's obtained. Medication not effective. N.O received for MRI and Carotid duplex. Patient made aware.  
Received message from RN of blood glucose 355. Patient given 4 units of Humalog per sliding scale.   Per review of chart hemoglobin A1c 7/25/2025 was 9.4%. Point-of-care glucose checks today 228 > 282 > 296 and 355 now.  Patient has orders for Lantus 18 units daily, 5 units lispro 3 times daily and corrective low-dose algorithm.  He is receiving prednisone 40 mg oral daily.  Elevated blood glucose likely steroid-induced, but with A1c of 9.4%, he will need better glycemic control.  Therefore, will consult endocrinology     Non billable  
Spiritual Health History and Assessment/Progress Note  [facility]    Initial Encounter,  ,  ,      Name: Alexandro Ortiz MRN: [id number]    Age: 75 y.o.     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)     Date: 7/23/2025            Total Time Calculated: 10 min              Spiritual Assessment began in [facility]IT[facility]        Referral/Consult From: Rounding   Encounter Overview/Reason: Initial Encounter  Service Provided For: Patient    [place], Belief, Meaning:   Patient identifies as spiritual, is connected with a [place] tradition or spiritual practice, and has beliefs or practices that help with coping during difficult times  Family/Friends No family/friends present      Importance and Influence:  Patient has spiritual/personal beliefs that influence decisions regarding their health  Family/Friends No family/friends present    Community:  Patient feels well-supported. Support system includes: Extended family and Other: Pt doesn't have [place] community, but a strong [place] that seems to comfort him  Family/Friends No family/friends present    Assessment and Plan of Care:     Patient Interventions include: Facilitated expression of thoughts and feelings, Explored spiritual coping/struggle/distress, Engaged in theological reflection, Affirmed coping skills/support systems, and Other: Prayed with pt and provided assurance of continued prayer  Family/Friends Interventions include: No family/friends present    Patient Plan of Care: Spiritual Care available upon further referral  Family/Friends Plan of Care: No family/friends present    Electronically signed by Jefe Phan  Intern on 7/23/2025 at 9:13 AM    
LOS: 3    Юлия Chen RN     
wounds   Musculoskeletal: Negative for joint pain, joint swelling, joint erythema    Endocrine: Negative for high or low blood sugars, heat or cold intolerance    Psych: Negative for manic behavior     Vitals:    07/29/25 1100   BP: 138/76   Pulse: 72   Resp: 12   Temp: 98.2 °F (36.8 °C)   SpO2: 95%           PHYSICAL EXAM:  General:          WD, WN. Alert, cooperative, no acute distress    EENT:              EOMI. Anicteric sclerae. MMM  Resp:               CTA bilaterally, no wheezing or rales.  No accessory muscle use  CV:                  Regular  rhythm,  No edema  GI:                   Soft, Non distended, Non tender.  +Bowel sounds  Neurologic:      Alert and oriented X 3, normal speech,   Psych:             Good insight. Not anxious nor agitated  Skin:                No rashes.  No jaundice.  ExtremitiesR/BKA stump intact  Left foot -hallux discoloration, eschar+      Recent Results (from the past 24 hours)   POCT Glucose    Collection Time: 07/28/25  4:19 PM   Result Value Ref Range    POC Glucose 282 (H) 65 - 117 mg/dL    Performed by: AVIS HIDALGO PCT    POCT Glucose    Collection Time: 07/28/25  8:20 PM   Result Value Ref Range    POC Glucose 424 (H) 65 - 117 mg/dL    Performed by: Renny Martin PCT    CBC    Collection Time: 07/29/25  5:07 AM   Result Value Ref Range    WBC 10.6 4.1 - 11.1 K/uL    RBC 4.43 4.10 - 5.70 M/uL    Hemoglobin 12.9 12.1 - 17.0 g/dL    Hematocrit 37.8 36.6 - 50.3 %    MCV 85.3 80.0 - 99.0 FL    MCH 29.1 26.0 - 34.0 PG    MCHC 34.1 30.0 - 36.5 g/dL    RDW 12.9 11.5 - 14.5 %    Platelets 282 150 - 400 K/uL    MPV 10.0 8.9 - 12.9 FL    Nucleated RBCs 0.0 0  WBC    nRBC 0.00 0.00 - 0.01 K/uL   Basic Metabolic Panel    Collection Time: 07/29/25  5:07 AM   Result Value Ref Range    Sodium 136 136 - 145 mmol/L    Potassium 4.3 3.5 - 5.1 mmol/L    Chloride 101 97 - 108 mmol/L    CO2 28 21 - 32 mmol/L    Anion Gap 7 2 - 12 mmol/L    Glucose 275 (H) 65 - 100 mg/dL    BUN 25 (H)

## 2025-07-31 ENCOUNTER — OFFICE VISIT (OUTPATIENT)
Facility: CLINIC | Age: 75
End: 2025-07-31
Payer: OTHER GOVERNMENT

## 2025-07-31 VITALS
HEART RATE: 66 BPM | RESPIRATION RATE: 16 BRPM | DIASTOLIC BLOOD PRESSURE: 66 MMHG | BODY MASS INDEX: 28.87 KG/M2 | SYSTOLIC BLOOD PRESSURE: 119 MMHG | WEIGHT: 207 LBS | TEMPERATURE: 97.2 F | OXYGEN SATURATION: 97 %

## 2025-07-31 DIAGNOSIS — Z89.511 HISTORY OF RIGHT BELOW KNEE AMPUTATION (HCC): Chronic | ICD-10-CM

## 2025-07-31 DIAGNOSIS — I10 ESSENTIAL HYPERTENSION: Chronic | ICD-10-CM

## 2025-07-31 DIAGNOSIS — Z79.4 TYPE 2 DIABETES MELLITUS WITH COMPLICATION, WITH LONG-TERM CURRENT USE OF INSULIN (HCC): Primary | Chronic | ICD-10-CM

## 2025-07-31 DIAGNOSIS — E78.2 MIXED HYPERLIPIDEMIA: Chronic | ICD-10-CM

## 2025-07-31 DIAGNOSIS — E11.8 TYPE 2 DIABETES MELLITUS WITH COMPLICATION, WITH LONG-TERM CURRENT USE OF INSULIN (HCC): Primary | Chronic | ICD-10-CM

## 2025-07-31 DIAGNOSIS — E11.11 DIABETIC KETOACIDOSIS WITH COMA ASSOCIATED WITH TYPE 2 DIABETES MELLITUS (HCC): ICD-10-CM

## 2025-07-31 DIAGNOSIS — I73.9 PAD (PERIPHERAL ARTERY DISEASE): Chronic | ICD-10-CM

## 2025-07-31 DIAGNOSIS — R40.4 TRANSIENT ALTERATION OF AWARENESS: ICD-10-CM

## 2025-07-31 DIAGNOSIS — G93.41 METABOLIC ENCEPHALOPATHY: ICD-10-CM

## 2025-07-31 PROBLEM — E11.10 DIABETIC ACIDOSIS WITHOUT COMA (HCC): Status: RESOLVED | Noted: 2025-07-28 | Resolved: 2025-07-31

## 2025-07-31 PROBLEM — J96.01 ACUTE RESPIRATORY FAILURE WITH HYPOXIA (HCC): Status: RESOLVED | Noted: 2025-07-28 | Resolved: 2025-07-31

## 2025-07-31 PROBLEM — B95.61 STAPHYLOCOCCUS AUREUS BACTEREMIA: Status: RESOLVED | Noted: 2025-07-28 | Resolved: 2025-07-31

## 2025-07-31 PROBLEM — N17.9 AKI (ACUTE KIDNEY INJURY): Status: RESOLVED | Noted: 2025-07-28 | Resolved: 2025-07-31

## 2025-07-31 PROBLEM — E11.65 POORLY CONTROLLED DIABETES MELLITUS (HCC): Status: RESOLVED | Noted: 2025-07-28 | Resolved: 2025-07-31

## 2025-07-31 PROBLEM — E10.10 DKA, TYPE 1, NOT AT GOAL (HCC): Status: RESOLVED | Noted: 2025-07-22 | Resolved: 2025-07-31

## 2025-07-31 PROBLEM — R78.81 STAPHYLOCOCCUS AUREUS BACTEREMIA: Status: RESOLVED | Noted: 2025-07-28 | Resolved: 2025-07-31

## 2025-07-31 PROCEDURE — 99308 SBSQ NF CARE LOW MDM 20: CPT | Performed by: FAMILY MEDICINE

## 2025-07-31 PROCEDURE — 3046F HEMOGLOBIN A1C LEVEL >9.0%: CPT | Performed by: FAMILY MEDICINE

## 2025-07-31 PROCEDURE — 1124F ACP DISCUSS-NO DSCNMKR DOCD: CPT | Performed by: FAMILY MEDICINE

## 2025-07-31 NOTE — PROGRESS NOTES
[facility]      SKILLED NURSING FACILITY VISIT      Alexandro Ortiz      Room: H 100  1950  [id number]      ASSESSMENT:     Diagnosis Orders   1. Type 2 diabetes mellitus with complication, with long-term current use of insulin (HCC)        2. Diabetic ketoacidosis with coma associated with type 2 diabetes mellitus (HCC)        3. Metabolic encephalopathy        4. Transient alteration of awareness        5. PAD (peripheral artery disease)        6. History of right below knee amputation (HCC)        7. Essential hypertension        8. Mixed hyperlipidemia              PLAN:    DIABETES MELLITUS: This problem is stable. Will continue current routine including diet, exercise and medication. Will continue close observation.    PERIPHERAL ARTERIAL DISEASE: This problem has deteriorated. Will avoid smoking and encourage regular aerobic exercise. Will continue to monitor for complications.     R BKA: This problem is stable. Will continue close surveillance.    HYPERTENSION: This problem is stable. Current treatment was continued as noted above.     HYPERLIPIDEMIA: This problem is stable. Will continue current treatment including diet, exercise and medication.       SUBJECTIVE:    Chief Complaint:  Chief Complaint   Patient presents with    Other     SNF ADMISSION: T2DM with DKA, PAD, CAD and        History of Present Illness:    Alexandro Ortiz is a 75 y.o. male who is seen for an intermittent visit at the skilled nursing facility. He was recently admitted with DKA after he stopped taking Insulin. He is generally feeling weak and easily fatigued.     The history is obtained from the patient, nursing staff, family and previous records and is felt to be satisfactory to establish an acceptable plan of care. Health status indicators were reviewed and there are no changes except as noted above. The past medical history, family history, social history and ethnic

## 2025-07-31 NOTE — DISCHARGE SUMMARY
Discharge Summary    Name: Alexandro Ortiz  [id number]  YOB: 1950 (Age: 75 y.o.)   Date of Admission: 7/22/2025  Date of Discharge: 7/30/2025  Attending Physician: No att. providers found    Discharge Diagnosis:   Diabetic ketoacidosis  Lactic acidosis secondary to sepsis and hypovolemia  Resolved  Acute encephalopathy, metabolic possibly secondary to DKA and hyperglycemia  Severe headache   Neck pain-likely musculoskeltal  Vertigo likely BPPV, less suspicion for CVA  Severe spinal canal stenosis with cord compression  Acute rhabdomyolysis  Acute kidney injury  Sepsis secondary to MSSA bacteremia    Consultations:  IP CONSULT TO PHARMACY  IP CONSULT TO INFECTIOUS DISEASES  IP CONSULT TO DIABETES MANAGEMENT  IP CONSULT TO NEUROLOGY  IP CONSULT TO NEUROSURGERY  IP CONSULT TO CASE MANAGEMENT  IP CONSULT TO VASCULAR ACCESS TEAM  IP CONSULT TO VASCULAR ACCESS TEAM      Brief Admission History/Reason for Admission Per Alex Lanier MD:   PNo prior records available in the chart, patient receives his care at [facility].  EMS found patient at home lethargic, covered with feces so brought into emergency department.  Patient was agitated/combative on arrival, blood glucose more than 700 and bicarb of 15.  CT head unremarkable.  Patient given IV Haldol, followed by IV Versed x 2 without control of agitation and started on Precedex drip.  Patient has received 1 L of normal saline and starting DKA protocol. Patient has empirically been given IV Zosyn, blood culture sent.  Chest x-ray and UA are negative for infectious etiology.  ICU service was consulted for evaluation for ICU.  At time of my evaluation, patient appears ill and lethargic.  He does not answer questions.  He is tachycardic in 120s, regular rhythm, hypertensive in 170s to 180s systolic.  He appears cachectic, chronically ill and not respiratory distress.  Satting more than 95% on 2 L nasal cannula.    Brief

## 2025-08-01 LAB
EKG ATRIAL RATE: 113 BPM
EKG DIAGNOSIS: NORMAL
EKG P AXIS: 70 DEGREES
EKG P-R INTERVAL: 152 MS
EKG Q-T INTERVAL: 358 MS
EKG QRS DURATION: 96 MS
EKG QTC CALCULATION (BAZETT): 491 MS
EKG R AXIS: 21 DEGREES
EKG T AXIS: 72 DEGREES
EKG VENTRICULAR RATE: 113 BPM

## 2025-08-02 ENCOUNTER — TELEPHONE (OUTPATIENT)
Facility: CLINIC | Age: 75
End: 2025-08-02

## 2025-08-02 DIAGNOSIS — R45.1 AGITATION: Primary | ICD-10-CM

## 2025-08-02 RX ORDER — LORAZEPAM 0.5 MG/1
0.5 TABLET ORAL ONCE
Qty: 1 TABLET | Refills: 0 | Status: SHIPPED | OUTPATIENT
Start: 2025-08-02 | End: 2025-08-02

## 2025-08-02 NOTE — TELEPHONE ENCOUNTER
[facility]. He notes that he spoke with daughter Lorna, educated her or risks, but she noted that if patient wants to leave he will be leaving AMA and to let him go. She will not be picking him up. He is still needing IV antibiotics until 08/06/2025 for MSSA bacteremia. It would be advisable to at least complete these.     This provider attempted to call patients sangeetha Lee, but voicemail was reached.     Will call nursing back at facility was unable to get patients daughter, if patient leaves AMA. Attempt to sign AMA form, remove PICC line and APS will be notified on Monday upon return by social work.     Staff noted patient being verbally and physically aggressive. Will give 0.5mg po x 1 dose now.

## 2025-08-04 ENCOUNTER — OFFICE VISIT (OUTPATIENT)
Facility: CLINIC | Age: 75
End: 2025-08-04
Payer: MEDICARE

## 2025-08-04 VITALS
HEART RATE: 64 BPM | SYSTOLIC BLOOD PRESSURE: 120 MMHG | TEMPERATURE: 97.3 F | BODY MASS INDEX: 28.87 KG/M2 | OXYGEN SATURATION: 97 % | RESPIRATION RATE: 18 BRPM | DIASTOLIC BLOOD PRESSURE: 76 MMHG | WEIGHT: 207 LBS

## 2025-08-04 DIAGNOSIS — I10 ESSENTIAL HYPERTENSION: ICD-10-CM

## 2025-08-04 DIAGNOSIS — I73.9 PAD (PERIPHERAL ARTERY DISEASE): ICD-10-CM

## 2025-08-04 DIAGNOSIS — R45.1 AGITATION: ICD-10-CM

## 2025-08-04 DIAGNOSIS — G93.41 METABOLIC ENCEPHALOPATHY: ICD-10-CM

## 2025-08-04 DIAGNOSIS — B95.61 MSSA BACTEREMIA: ICD-10-CM

## 2025-08-04 DIAGNOSIS — L97.522 SKIN ULCER OF TOE OF LEFT FOOT WITH FAT LAYER EXPOSED (HCC): ICD-10-CM

## 2025-08-04 DIAGNOSIS — M54.2 NECK PAIN: ICD-10-CM

## 2025-08-04 DIAGNOSIS — E10.10 DKA, TYPE 1, NOT AT GOAL (HCC): Primary | ICD-10-CM

## 2025-08-04 DIAGNOSIS — R78.81 MSSA BACTEREMIA: ICD-10-CM

## 2025-08-04 PROCEDURE — 1124F ACP DISCUSS-NO DSCNMKR DOCD: CPT | Performed by: NURSE PRACTITIONER

## 2025-08-04 PROCEDURE — 99309 SBSQ NF CARE MODERATE MDM 30: CPT | Performed by: NURSE PRACTITIONER

## 2025-08-04 PROCEDURE — 3046F HEMOGLOBIN A1C LEVEL >9.0%: CPT | Performed by: NURSE PRACTITIONER

## 2025-08-06 ENCOUNTER — OFFICE VISIT (OUTPATIENT)
Facility: CLINIC | Age: 75
End: 2025-08-06

## 2025-08-06 VITALS
DIASTOLIC BLOOD PRESSURE: 70 MMHG | WEIGHT: 207.3 LBS | HEART RATE: 75 BPM | TEMPERATURE: 97.8 F | OXYGEN SATURATION: 98 % | SYSTOLIC BLOOD PRESSURE: 122 MMHG | RESPIRATION RATE: 18 BRPM | BODY MASS INDEX: 28.91 KG/M2

## 2025-08-06 DIAGNOSIS — M54.2 NECK PAIN: ICD-10-CM

## 2025-08-06 DIAGNOSIS — Z79.4 TYPE 2 DIABETES MELLITUS WITH COMPLICATION, WITH LONG-TERM CURRENT USE OF INSULIN (HCC): Primary | ICD-10-CM

## 2025-08-06 DIAGNOSIS — R78.81 MSSA BACTEREMIA: ICD-10-CM

## 2025-08-06 DIAGNOSIS — I10 ESSENTIAL HYPERTENSION: ICD-10-CM

## 2025-08-06 DIAGNOSIS — I73.9 PAD (PERIPHERAL ARTERY DISEASE): ICD-10-CM

## 2025-08-06 DIAGNOSIS — L97.522 SKIN ULCER OF TOE OF LEFT FOOT WITH FAT LAYER EXPOSED (HCC): ICD-10-CM

## 2025-08-06 DIAGNOSIS — E11.8 TYPE 2 DIABETES MELLITUS WITH COMPLICATION, WITH LONG-TERM CURRENT USE OF INSULIN (HCC): Primary | ICD-10-CM

## 2025-08-06 DIAGNOSIS — B95.61 MSSA BACTEREMIA: ICD-10-CM

## 2025-08-06 DIAGNOSIS — G93.41 METABOLIC ENCEPHALOPATHY: ICD-10-CM

## 2025-08-06 DIAGNOSIS — R45.1 AGITATION: ICD-10-CM

## 2025-08-06 RX ORDER — OXYCODONE HYDROCHLORIDE 5 MG/1
5 TABLET ORAL EVERY 6 HOURS PRN
Qty: 20 TABLET | Refills: 0 | Status: SHIPPED | OUTPATIENT
Start: 2025-08-06 | End: 2025-08-11